# Patient Record
Sex: FEMALE | Race: WHITE | NOT HISPANIC OR LATINO | Employment: PART TIME | RURAL
[De-identification: names, ages, dates, MRNs, and addresses within clinical notes are randomized per-mention and may not be internally consistent; named-entity substitution may affect disease eponyms.]

---

## 2017-03-15 ENCOUNTER — OFFICE VISIT (OUTPATIENT)
Dept: CARDIOLOGY | Facility: CLINIC | Age: 62
End: 2017-03-15

## 2017-03-15 VITALS
DIASTOLIC BLOOD PRESSURE: 94 MMHG | BODY MASS INDEX: 38.76 KG/M2 | WEIGHT: 227 LBS | SYSTOLIC BLOOD PRESSURE: 162 MMHG | HEART RATE: 64 BPM | HEIGHT: 64 IN

## 2017-03-15 DIAGNOSIS — E78.00 HYPERCHOLESTEREMIA: ICD-10-CM

## 2017-03-15 DIAGNOSIS — E03.9 ACQUIRED HYPOTHYROIDISM: ICD-10-CM

## 2017-03-15 DIAGNOSIS — E88.81 METABOLIC SYNDROME: ICD-10-CM

## 2017-03-15 DIAGNOSIS — I10 ESSENTIAL HYPERTENSION: ICD-10-CM

## 2017-03-15 DIAGNOSIS — E83.52 HYPERCALCEMIA: ICD-10-CM

## 2017-03-15 DIAGNOSIS — I48.0 PAF (PAROXYSMAL ATRIAL FIBRILLATION) (HCC): Primary | ICD-10-CM

## 2017-03-15 DIAGNOSIS — E83.42 HYPOMAGNESEMIA: ICD-10-CM

## 2017-03-15 PROCEDURE — 93000 ELECTROCARDIOGRAM COMPLETE: CPT | Performed by: INTERNAL MEDICINE

## 2017-03-15 PROCEDURE — 99213 OFFICE O/P EST LOW 20 MIN: CPT | Performed by: INTERNAL MEDICINE

## 2017-03-15 RX ORDER — GABAPENTIN 100 MG/1
100 CAPSULE ORAL 3 TIMES DAILY
COMMUNITY
End: 2018-07-24 | Stop reason: ALTCHOICE

## 2017-03-15 RX ORDER — LISINOPRIL 5 MG/1
5 TABLET ORAL DAILY
COMMUNITY
End: 2018-07-24 | Stop reason: SDUPTHER

## 2017-03-15 NOTE — PROGRESS NOTES
Chief Complaint   Patient presents with   • Follow-up     shortness of breath no worse usual. Denies palpitations. Doesn't need refills at this time. Labs per endocrinologist in August. Was started on gabapentin for pinched nerve and wanted your opinion before she started taking.    • Chest Pain     Has had some heaviness in her chest for the past few days.        CARDIAC COMPLAINTS  chest pressure/discomfort, dyspnea and back pain        Subjective   Dunia Eduardo is a 61 y.o. female came in today for her follow up visit.  She has history of PAF who has converted and has done very well with medication.  She came today stating that she has been having trouble with her back and apparenlty she was carrying her dog and injured her back.  She was started on Neurontin and she wants to know if she can take it without any cardiac side effects.  She also has been having some chest tightness recently.  She claims she had a stress test in the last couple of years.              Cardiac History  Past Surgical History   Procedure Laterality Date   • Other surgical history  12/2011     cerebral angiogram, [facility] [place]   • Echo - converted  08/16/2012     EF 65%. RVSP-41mmHg. R/O PFO   • Echo - converted  09/24/2012     small PFO   • Cardiovascular stress test  09/24/2012     5 Min, 85% THR. BP-178/90. Pt had CP. Negative    • Other surgical history  2012     Cardionet-PAF   • Other surgical history  11/02/2012     small PFO   • Echo - converted  01/16/2015     EF 65%   • Cardioversion  01/23/2015       Current Outpatient Prescriptions   Medication Sig Dispense Refill   • apixaban (ELIQUIS) 5 MG tablet tablet Take 5 mg by mouth 2 (two) times a day.     • diltiazem CD (CARDIZEM CD) 120 MG 24 hr capsule TAKE ONE CAPSULE BY MOUTH DAILY ONCE A DAY ORALLY 90 DAYS 90 capsule 1   • flecainide (TAMBOCOR) 100 MG tablet TAKE 1 TABLET BY MOUTH EVERY 12 HOURS 180 tablet 2   • gabapentin (NEURONTIN) 100 MG capsule Take 100 mg by mouth 3  (Three) Times a Day.     • levothyroxine (SYNTHROID, LEVOTHROID) 50 MCG tablet Take 50 mcg by mouth daily.     • lisinopril (PRINIVIL,ZESTRIL) 5 MG tablet Take 5 mg by mouth 2 (Two) Times a Day.     • sertraline (ZOLOFT) 25 MG tablet Take 25 mg by mouth daily.       No current facility-administered medications for this visit.        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     Social History Main Topics   • Smoking status: Former Smoker     Quit date: 9/14/2008   • Smokeless tobacco: Never Used   • Alcohol use No   • Drug use: No   • Sexual activity: Not on file     Other Topics Concern   • Not on file     Social History Narrative       Family History   Problem Relation Age of Onset   • Heart failure Mother    • Heart failure Father    • Hypertension Brother        Review of Systems   Constitutional: Positive for fatigue. Negative for activity change.   HENT: Negative for congestion.    Eyes: Negative for discharge.   Respiratory: Positive for chest tightness. Negative for shortness of breath.    Cardiovascular: Positive for chest pain.   Gastrointestinal: Negative for abdominal distention.   Endocrine: Negative for cold intolerance.   Genitourinary: Negative for difficulty urinating.   Musculoskeletal: Positive for arthralgias and back pain.   Skin: Negative for color change.   Allergic/Immunologic: Negative  "for environmental allergies.   Neurological: Negative for dizziness and speech difficulty.   Hematological: Negative for adenopathy.       Diabetes- No  Thyroid- abnormal    Objective     Visit Vitals   • /94   • Pulse 64   • Ht 64\" (162.6 cm)   • Wt 227 lb (103 kg)   • BMI 38.96 kg/m2       Physical Exam   Constitutional: She appears well-developed.   HENT:   Head: Normocephalic.   Eyes: Pupils are equal, round, and reactive to light.   Neck: Normal range of motion.   Cardiovascular: Normal rate.    Murmur heard.  Pulmonary/Chest: Effort normal.   Musculoskeletal: Normal range of motion.   Neurological: She is alert.   Skin: Skin is warm.         ECG 12 Lead  Date/Time: 3/15/2017 11:33 AM  Performed by: EMA SOTO  Authorized by: EMA SOTO   Comparison: compared with previous ECG from 9/14/2016  Similar to previous ECG  Rhythm: sinus rhythm  Rate: normal  QRS axis: left  Other findings: LVH  Clinical impression: abnormal ECG                  Assessment/Plan   Her HR is stable.  BP is slightly elevated.  EKG shows sinus rhythm with normal QTc.  I had a long talk with her about the BP.  If it continues to go up, she may need to increase the dose of Lisinopril.  She also has history of hypercalcinemia and was advised to undergo parathyroidectomy which she has refused.  Her labs should be rechecked also.  Her cardiac status appears stable.  If the chest tightness persist, she may need to undergo repeat stress test.  Dunia was seen today for follow-up and chest pain.    Diagnoses and all orders for this visit:    PAF (paroxysmal atrial fibrillation)  -     TSH; Future    Acquired hypothyroidism  -     TSH; Future    Metabolic syndrome  -     CBC & Differential; Future    Hypercalcemia  -     PTH, Intact; Future    Hypomagnesemia  -     Magnesium; Future    Hypercholesteremia  -     Lipid Panel; Future    Essential hypertension  -     Comprehensive Metabolic Panel; Future                 "         Electronically signed by Karely Avina MD March 15, 2017 11:30 AM

## 2017-03-27 RX ORDER — APIXABAN 5 MG/1
TABLET, FILM COATED ORAL
Qty: 180 TABLET | Refills: 2 | Status: SHIPPED | OUTPATIENT
Start: 2017-03-27 | End: 2018-01-06 | Stop reason: SDUPTHER

## 2017-06-06 RX ORDER — DILTIAZEM HYDROCHLORIDE 120 MG/1
CAPSULE, COATED, EXTENDED RELEASE ORAL
Qty: 90 CAPSULE | Refills: 1 | Status: SHIPPED | OUTPATIENT
Start: 2017-06-06 | End: 2017-11-20 | Stop reason: SDUPTHER

## 2017-06-21 RX ORDER — FLECAINIDE ACETATE 100 MG/1
TABLET ORAL
Qty: 180 TABLET | Refills: 2 | Status: SHIPPED | OUTPATIENT
Start: 2017-06-21 | End: 2018-04-04 | Stop reason: SDUPTHER

## 2017-11-20 RX ORDER — DILTIAZEM HYDROCHLORIDE 120 MG/1
CAPSULE, COATED, EXTENDED RELEASE ORAL
Qty: 90 CAPSULE | Refills: 1 | Status: SHIPPED | OUTPATIENT
Start: 2017-11-20 | End: 2018-04-16 | Stop reason: SDUPTHER

## 2017-11-21 ENCOUNTER — OFFICE VISIT (OUTPATIENT)
Dept: CARDIOLOGY | Facility: CLINIC | Age: 62
End: 2017-11-21

## 2017-11-21 VITALS
HEIGHT: 64 IN | WEIGHT: 238 LBS | SYSTOLIC BLOOD PRESSURE: 140 MMHG | DIASTOLIC BLOOD PRESSURE: 90 MMHG | HEART RATE: 64 BPM | BODY MASS INDEX: 40.63 KG/M2

## 2017-11-21 DIAGNOSIS — I10 ESSENTIAL HYPERTENSION: ICD-10-CM

## 2017-11-21 DIAGNOSIS — E03.9 ACQUIRED HYPOTHYROIDISM: ICD-10-CM

## 2017-11-21 DIAGNOSIS — I48.0 PAF (PAROXYSMAL ATRIAL FIBRILLATION) (HCC): Primary | ICD-10-CM

## 2017-11-21 DIAGNOSIS — E83.52 HYPERCALCEMIA: ICD-10-CM

## 2017-11-21 DIAGNOSIS — E78.00 HYPERCHOLESTEREMIA: ICD-10-CM

## 2017-11-21 DIAGNOSIS — E34.9 INCREASED PTH LEVEL: ICD-10-CM

## 2017-11-21 PROBLEM — R79.89 INCREASED PTH LEVEL: Status: ACTIVE | Noted: 2017-11-21

## 2017-11-21 PROCEDURE — 93000 ELECTROCARDIOGRAM COMPLETE: CPT | Performed by: INTERNAL MEDICINE

## 2017-11-21 PROCEDURE — 99213 OFFICE O/P EST LOW 20 MIN: CPT | Performed by: INTERNAL MEDICINE

## 2017-11-21 NOTE — PROGRESS NOTES
Chief Complaint   Patient presents with   • Follow-up     For cardiac management. Reports having some shortness of breath on exertion. Reports last lab work was done a couplt of months ago per PCP, not in chart, will have them send us a copy.    • Med Refill     Does not need refills at this time.        CARDIAC COMPLAINTS  dyspnea        Subjective   Dunia Eduardo is a 61 y.o. female came in today for her follow-up visit.  She has history of PAF for which she has undergone cardioversion in 2015.  She came today with symptoms of shortness of breath almost same as before.  In March of this year her lab work showed evidence of hypercalcemia and elevated PTH.  She is supposed to see a surgeon to undergo parathyroidectomy.  Apparently she decided not to undergo any of the procedure.  She has some lab work few months ago but I don't have the report.              Cardiac History  Past Surgical History:   Procedure Laterality Date   • CARDIOVASCULAR STRESS TEST  09/24/2012    5 Min, 85% THR. BP-178/90. Pt had CP. Negative    • CARDIOVERSION  01/23/2015    Converted    • ECHO - CONVERTED  08/16/2012    EF 65%. RVSP-41mmHg. R/O PFO   • ECHO - CONVERTED  09/24/2012    small PFO   • ECHO - CONVERTED  01/16/2015    EF 65%   • OTHER SURGICAL HISTORY  12/2011    cerebral angiogram, [facility]   • OTHER SURGICAL HISTORY  2012    Cardionet-PAF   • OTHER SURGICAL HISTORY  11/02/2012    small PFO       Current Outpatient Prescriptions   Medication Sig Dispense Refill   • diltiaZEM CD (CARDIZEM CD) 120 MG 24 hr capsule TAKE ONE CAPSULE BY MOUTH ONCE DAILY 90 capsule 1   • ELIQUIS 5 MG tablet tablet TAKE 1 TABLET TWICE A  tablet 2   • flecainide (TAMBOCOR) 100 MG tablet TAKE 1 TABLET BY MOUTH EVERY 12 HOURS 180 tablet 2   • gabapentin (NEURONTIN) 100 MG capsule Take 100 mg by mouth 3 (Three) Times a Day.     • levothyroxine (SYNTHROID, LEVOTHROID) 50 MCG tablet Take 50 mcg by mouth daily.     • lisinopril  (PRINIVIL,ZESTRIL) 5 MG tablet Take 5 mg by mouth 2 (Two) Times a Day.     • sertraline (ZOLOFT) 25 MG tablet Take 25 mg by mouth daily.       No current facility-administered medications for this visit.        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     Social History Main Topics   • Smoking status: Former Smoker     Quit date: 9/14/2008   • Smokeless tobacco: Never Used   • Alcohol use No   • Drug use: No   • Sexual activity: Not on file     Other Topics Concern   • Not on file     Social History Narrative       Family History   Problem Relation Age of Onset   • Heart failure Mother    • Heart failure Father    • Hypertension Brother        Review of Systems   Constitution: Positive for malaise/fatigue. Negative for decreased appetite.   HENT: Negative for congestion and sore throat.    Eyes: Negative for blurred vision.   Cardiovascular: Positive for dyspnea on exertion. Negative for chest pain.   Respiratory: Positive for shortness of breath. Negative for snoring.    Endocrine: Negative for cold intolerance and heat intolerance.   Hematologic/Lymphatic: Negative for adenopathy. Does not bruise/bleed easily.   Skin: Negative for itching, nail changes and skin cancer.   Musculoskeletal: Positive for arthritis. Negative for myalgias.   Gastrointestinal: Negative for abdominal pain, dysphagia and heartburn.  "  Genitourinary: Negative for bladder incontinence and frequency.   Neurological: Negative for dizziness, light-headedness, seizures and vertigo.   Psychiatric/Behavioral: Negative for altered mental status.   Allergic/Immunologic: Negative for environmental allergies and hives.       Diabetes- No  Thyroid- abnormal    Objective     /90 (BP Location: Right arm)  Pulse 64  Ht 64\" (162.6 cm)  Wt 238 lb (108 kg)  BMI 40.85 kg/m2    Physical Exam   Constitutional: She is oriented to person, place, and time. She appears well-developed and well-nourished.   HENT:   Head: Normocephalic.   Eyes: Pupils are equal, round, and reactive to light.   Neck: Normal range of motion.   Cardiovascular: Normal rate, regular rhythm, S1 normal and S2 normal.    Murmur heard.  Pulmonary/Chest: Effort normal and breath sounds normal.   Abdominal: Soft. Bowel sounds are normal.   Musculoskeletal: Normal range of motion. She exhibits no edema.   Neurological: She is alert and oriented to person, place, and time.   Skin: Skin is warm.   Psychiatric: She has a normal mood and affect.       ECG 12 Lead  Date/Time: 11/21/2017 5:02 PM  Performed by: EMA SOTO  Authorized by: EMA SOTO   Comparison: compared with previous ECG from 3/20/2017  Similar to previous ECG  Rhythm: sinus rhythm  Rate: normal  QRS axis: normal  Q waves: V1 and V2  Clinical impression: abnormal ECG                    Assessment/Plan     Dunia was seen today for follow-up and med refill.    Diagnoses and all orders for this visit:    PAF (paroxysmal atrial fibrillation)    Acquired hypothyroidism    Hypercalcemia    Increased PTH level    Essential hypertension    Hypercholesteremia       Her heart rate and blood pressure appears to be stable.  EKG showed sinus rhythm with normal QTC.  Old Q waves in V1 and V2.  Her clinical examination is unremarkable.  I had a long talk with her about the hypercalcemia and the PTH level.  I explained to her " the increased risk of having hypercalcemia.  She is advised to talk to you regarding that.  Her cardiac status appears stable.  Clearance will be given if needed for the surgery.                  Electronically signed by Karely Avina MD November 21, 2017 4:59 PM

## 2018-01-08 RX ORDER — APIXABAN 5 MG/1
TABLET, FILM COATED ORAL
Qty: 180 TABLET | Refills: 3 | Status: SHIPPED | OUTPATIENT
Start: 2018-01-08 | End: 2018-07-24 | Stop reason: SDUPTHER

## 2018-01-29 ENCOUNTER — TELEPHONE (OUTPATIENT)
Dept: CARDIOLOGY | Facility: CLINIC | Age: 63
End: 2018-01-29

## 2018-01-29 NOTE — TELEPHONE ENCOUNTER
PCP's office called wanting pt to be seen, CP, dizziness.  Do you want follow up scheduled or testing?  Last stress was in 2012, last echo was 2015.

## 2018-04-09 RX ORDER — FLECAINIDE ACETATE 100 MG/1
TABLET ORAL
Qty: 180 TABLET | Refills: 2 | Status: SHIPPED | OUTPATIENT
Start: 2018-04-09 | End: 2018-07-24 | Stop reason: SDUPTHER

## 2018-04-16 ENCOUNTER — TELEPHONE (OUTPATIENT)
Dept: CARDIOLOGY | Facility: CLINIC | Age: 63
End: 2018-04-16

## 2018-04-16 RX ORDER — DILTIAZEM HYDROCHLORIDE 120 MG/1
120 CAPSULE, COATED, EXTENDED RELEASE ORAL DAILY
Qty: 90 CAPSULE | Refills: 1 | Status: SHIPPED | OUTPATIENT
Start: 2018-04-16 | End: 2018-07-24 | Stop reason: SDUPTHER

## 2018-04-16 NOTE — TELEPHONE ENCOUNTER
Patient called requesting refill on Cardizem CD 120mg once daily. Refill on cardizem CD 120mg daily sent to [id number] with 1 refill.

## 2018-05-21 ENCOUNTER — TELEPHONE (OUTPATIENT)
Dept: CARDIOLOGY | Facility: CLINIC | Age: 63
End: 2018-05-21

## 2018-05-21 NOTE — TELEPHONE ENCOUNTER
Received fax from Dr. Eze Rizzo for cardiac clearance for patient to have a parathyroidectomy on 05/29/18. Patient is on Eliquis. According to our records, I do not see where patient has had any stenting.       Fax [phone]

## 2018-07-24 ENCOUNTER — OFFICE VISIT (OUTPATIENT)
Dept: CARDIOLOGY | Facility: CLINIC | Age: 63
End: 2018-07-24

## 2018-07-24 VITALS
HEIGHT: 64 IN | HEART RATE: 61 BPM | SYSTOLIC BLOOD PRESSURE: 128 MMHG | WEIGHT: 237 LBS | BODY MASS INDEX: 40.46 KG/M2 | DIASTOLIC BLOOD PRESSURE: 88 MMHG

## 2018-07-24 DIAGNOSIS — R12 HEART BURN: ICD-10-CM

## 2018-07-24 DIAGNOSIS — R55 NEAR SYNCOPE: ICD-10-CM

## 2018-07-24 DIAGNOSIS — I10 ESSENTIAL HYPERTENSION: ICD-10-CM

## 2018-07-24 DIAGNOSIS — E78.00 HYPERCHOLESTEREMIA: ICD-10-CM

## 2018-07-24 DIAGNOSIS — E88.81 METABOLIC SYNDROME: ICD-10-CM

## 2018-07-24 DIAGNOSIS — I48.0 PAF (PAROXYSMAL ATRIAL FIBRILLATION) (HCC): Primary | ICD-10-CM

## 2018-07-24 DIAGNOSIS — E34.9 INCREASED PTH LEVEL: ICD-10-CM

## 2018-07-24 PROBLEM — E88.810 METABOLIC SYNDROME: Status: ACTIVE | Noted: 2018-07-24

## 2018-07-24 PROCEDURE — 99214 OFFICE O/P EST MOD 30 MIN: CPT | Performed by: INTERNAL MEDICINE

## 2018-07-24 RX ORDER — ERGOCALCIFEROL 1.25 MG/1
50000 CAPSULE ORAL WEEKLY
COMMUNITY
End: 2018-10-30

## 2018-07-24 RX ORDER — FLECAINIDE ACETATE 100 MG/1
100 TABLET ORAL EVERY 12 HOURS
Qty: 180 TABLET | Refills: 3 | Status: SHIPPED | OUTPATIENT
Start: 2018-07-24 | End: 2018-10-30

## 2018-07-24 RX ORDER — RANITIDINE 300 MG/1
300 TABLET ORAL NIGHTLY
Qty: 90 TABLET | Refills: 4 | Status: SHIPPED | OUTPATIENT
Start: 2018-07-24 | End: 2020-08-18

## 2018-07-24 RX ORDER — LISINOPRIL 5 MG/1
5 TABLET ORAL DAILY
Qty: 90 TABLET | Refills: 3 | Status: SHIPPED | OUTPATIENT
Start: 2018-07-24 | End: 2019-03-26

## 2018-07-24 RX ORDER — DILTIAZEM HYDROCHLORIDE 120 MG/1
120 CAPSULE, COATED, EXTENDED RELEASE ORAL DAILY
Qty: 90 CAPSULE | Refills: 4 | Status: SHIPPED | OUTPATIENT
Start: 2018-07-24 | End: 2019-09-29 | Stop reason: SDUPTHER

## 2018-07-25 PROCEDURE — 93000 ELECTROCARDIOGRAM COMPLETE: CPT | Performed by: INTERNAL MEDICINE

## 2018-07-25 NOTE — PROGRESS NOTES
Chief Complaint   Patient presents with   • Follow-up     for cardiac management   • Near syncope     multiple epidoses, always occurs while standing, feeling comes over her, goes away after sitting down. Last episode was this morning. Chest heaviness noted with the episodes.    • Palpitations     much better, occuring maybe twice a month   • Procedure     partial parathyroidectomy, May 29th, Dr iRzzo in [place]   • Med Refill     refills needed on cardiac meds. 90 days to [facility], asking for a copay card for the Eliquis.    • Labs     checked since surgery, vitamin D added       CARDIAC COMPLAINTS  Dizziness, palpitations and syncope        Subjective   Dunia Eduardo is a 62 y.o. female came in today for her follow-up visit.  She has history of atrial fibrillation for which she underwent cardioversion in January 2015.  She also has a normal LV function and mild pulmonary hypertension.  Since I have seen her she did undergo partial parathyroidectomy and now is taking vitamin D.  She is not having that many palpitation as before.  Her major problem is dizziness and lightheadedness and near-syncopal episode.  It occurs suddenly and she has to sit down and within a few minutes the episode gets better.  She denies having any chest pain.  Her lab work is essentially followed at your office.              Cardiac History  Past Surgical History:   Procedure Laterality Date   • CARDIOVASCULAR STRESS TEST  09/24/2012    5 Min, 85% THR. BP-178/90. Pt had CP. Negative    • CARDIOVERSION  01/23/2015    Converted    • ECHO - CONVERTED  08/16/2012    EF 65%. RVSP-41mmHg. R/O PFO   • ECHO - CONVERTED  09/24/2012    small PFO   • ECHO - CONVERTED  01/16/2015    EF 65%   • OTHER SURGICAL HISTORY  12/2011    cerebral angiogram, [facility]   • OTHER SURGICAL HISTORY  2012    Cardionet-PAF   • OTHER SURGICAL HISTORY  11/02/2012    small PFO       Current Outpatient Prescriptions   Medication Sig Dispense Refill   • apixaban  (ELIQUIS) 5 MG tablet tablet Take 1 tablet by mouth 2 (Two) Times a Day. 180 tablet 4   • diltiaZEM CD (CARDIZEM CD) 120 MG 24 hr capsule Take 1 capsule by mouth Daily. 90 capsule 4   • flecainide (TAMBOCOR) 100 MG tablet Take 1 tablet by mouth Every 12 (Twelve) Hours. 180 tablet 3   • levothyroxine (SYNTHROID, LEVOTHROID) 50 MCG tablet Take 50 mcg by mouth daily.     • lisinopril (PRINIVIL,ZESTRIL) 5 MG tablet Take 1 tablet by mouth Daily. 90 tablet 3   • sertraline (ZOLOFT) 25 MG tablet Take 25 mg by mouth daily.     • vitamin D (ERGOCALCIFEROL) 64222 units capsule capsule Take 50,000 Units by mouth 1 (One) Time Per Week.     • raNITIdine (ZANTAC) 300 MG tablet Take 1 tablet by mouth Every Night. 90 tablet 4     No current facility-administered medications for this visit.        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   • Gallbladder removed    • Heart murmur    • Hx of cholecystectomy    • Hx of hysterectomy    • Hypercalcemia    • Hyperlipidemia    • Hypertension    • Hypothyroidism    • PAF (paroxysmal atrial fibrillation) (CMS/HCC)        Social History     Social History   • Marital status:      Spouse name: N/A   • Number of children: N/A   • Years of education: N/A     Occupational History   • Not on file.     Social History Main Topics   • Smoking status: Former Smoker     Quit date: 9/14/2008   • Smokeless tobacco: Never Used   • Alcohol use No   • Drug use: No   • Sexual activity: Not on file     Other Topics Concern   • Not on file     Social History Narrative   • No narrative on file       Family History   Problem Relation Age of Onset   • Heart failure Mother    • Heart failure Father    • Hypertension Brother        Review of Systems   Constitution:  "Positive for malaise/fatigue. Negative for decreased appetite.   HENT: Negative for congestion and sore throat.    Eyes: Negative for blurred vision.   Cardiovascular: Positive for near-syncope and palpitations. Negative for chest pain.   Respiratory: Negative for shortness of breath and snoring.    Endocrine: Negative for cold intolerance and heat intolerance.   Hematologic/Lymphatic: Negative for adenopathy. Does not bruise/bleed easily.   Skin: Negative for itching, nail changes and skin cancer.   Musculoskeletal: Negative for arthritis and myalgias.   Gastrointestinal: Negative for abdominal pain, dysphagia and heartburn.   Genitourinary: Negative for bladder incontinence and frequency.   Neurological: Positive for dizziness. Negative for light-headedness, seizures and vertigo.   Psychiatric/Behavioral: Negative for altered mental status.   Allergic/Immunologic: Negative for environmental allergies and hives.       Diabetes- No  Thyroid- normal    Objective     /88   Pulse 61   Ht 162.6 cm (64\")   Wt 108 kg (237 lb)   BMI 40.68 kg/m²     Physical Exam   Constitutional: She is oriented to person, place, and time. She appears well-developed and well-nourished.   HENT:   Head: Normocephalic.   Nose: Nose normal.   Eyes: Pupils are equal, round, and reactive to light. EOM are normal.   Neck: Normal range of motion. Neck supple.   Cardiovascular: Normal rate, regular rhythm, S1 normal and S2 normal.    Murmur heard.  Pulmonary/Chest: Effort normal and breath sounds normal.   Abdominal: Soft. Bowel sounds are normal.   Musculoskeletal: Normal range of motion. She exhibits no edema.   Neurological: She is alert and oriented to person, place, and time.   Skin: Skin is warm and dry.   Psychiatric: She has a normal mood and affect.       ECG 12 Lead  Date/Time: 7/25/2018 5:41 AM  Performed by: EMA SOTO  Authorized by: EMA SOTO   Comparison: compared with previous ECG from " 11/21/2017  Comparison to previous ECG: Axis has changed  Rhythm: sinus rhythm  Rate: normal  Conduction: non-specific intraventricular conduction delay  QRS axis: left  Q waves: V3 and V4  Clinical impression: abnormal ECG                    Assessment/Plan   Patient's Body mass index is 40.68 kg/m². BMI is above normal parameters. Recommendations include: educational material, exercise counseling and nutrition counseling.     Dunia was seen today for follow-up, near syncope, palpitations, procedure, med refill and labs.    Diagnoses and all orders for this visit:    PAF (paroxysmal atrial fibrillation) (CMS/HCC)  -     flecainide (TAMBOCOR) 100 MG tablet; Take 1 tablet by mouth Every 12 (Twelve) Hours.  -     apixaban (ELIQUIS) 5 MG tablet tablet; Take 1 tablet by mouth 2 (Two) Times a Day.  -     diltiaZEM CD (CARDIZEM CD) 120 MG 24 hr capsule; Take 1 capsule by mouth Daily.  -     Stress Test With Myocardial Perfusion One Day; Future    Essential hypertension  -     lisinopril (PRINIVIL,ZESTRIL) 5 MG tablet; Take 1 tablet by mouth Daily.  -     diltiaZEM CD (CARDIZEM CD) 120 MG 24 hr capsule; Take 1 capsule by mouth Daily.  -     Adult Transthoracic Echo Complete W/ Cont if Necessary Per Protocol; Future    Hypercholesteremia    Increased PTH level    Near syncope  -     Stress Test With Myocardial Perfusion One Day; Future    Metabolic syndrome  -     Adult Transthoracic Echo Complete W/ Cont if Necessary Per Protocol; Future    Heart burn  -     raNITIdine (ZANTAC) 300 MG tablet; Take 1 tablet by mouth Every Night.  -     Stress Test With Myocardial Perfusion One Day; Future       Her heart rate and blood pressure appears stable.  Her EKG showed sinus rhythm her axis was changed to left, IVCD and Q waves in V3 and V4.  Her BMI is still around 41.  I talked to her about diet, exercise and weight reduction.  At this time continue the same medications.  She has been having some heartburn so I added Zantac 300 mg  once a day .Her last cardiac workup was few years ago.  I scheduled her to repeat the stress test to rule out any ischemia causing the heartburn and also rule out any stress-induced arrhythmia.  Also scheduled her to undergo the echocardiogram to reevaluate the LV function and also to evaluate the PA pressure.  Refill for other cardiac medications were given.  Copayment card for Eliquis was given.                  Electronically signed by Karely Avina MD July 25, 2018 5:35 AM

## 2018-08-01 ENCOUNTER — HOSPITAL ENCOUNTER (OUTPATIENT)
Dept: CARDIOLOGY | Facility: HOSPITAL | Age: 63
Discharge: HOME | End: 2018-08-01
Attending: INTERNAL MEDICINE

## 2018-08-01 DIAGNOSIS — R12 HEART BURN: ICD-10-CM

## 2018-08-01 DIAGNOSIS — I10 ESSENTIAL HYPERTENSION: ICD-10-CM

## 2018-08-01 DIAGNOSIS — R55 NEAR SYNCOPE: ICD-10-CM

## 2018-08-01 DIAGNOSIS — E88.81 METABOLIC SYNDROME: ICD-10-CM

## 2018-08-01 DIAGNOSIS — I48.0 PAF (PAROXYSMAL ATRIAL FIBRILLATION) (HCC): ICD-10-CM

## 2018-08-01 LAB
MAXIMAL PREDICTED HEART RATE: 156 BPM
MAXIMAL PREDICTED HEART RATE: 156 BPM
STRESS TARGET HR: 133 BPM
STRESS TARGET HR: 133 BPM

## 2018-08-01 PROCEDURE — A9500 TC99M SESTAMIBI: HCPCS | Performed by: INTERNAL MEDICINE

## 2018-08-01 PROCEDURE — 93018 CV STRESS TEST I&R ONLY: CPT | Performed by: INTERNAL MEDICINE

## 2018-08-01 PROCEDURE — 93017 CV STRESS TEST TRACING ONLY: CPT

## 2018-08-01 PROCEDURE — 93306 TTE W/DOPPLER COMPLETE: CPT | Performed by: INTERNAL MEDICINE

## 2018-08-01 PROCEDURE — 93306 TTE W/DOPPLER COMPLETE: CPT

## 2018-08-01 PROCEDURE — 0 TECHNETIUM SESTAMIBI: Performed by: INTERNAL MEDICINE

## 2018-08-01 PROCEDURE — 78452 HT MUSCLE IMAGE SPECT MULT: CPT | Performed by: INTERNAL MEDICINE

## 2018-08-01 PROCEDURE — 78452 HT MUSCLE IMAGE SPECT MULT: CPT

## 2018-08-01 RX ADMIN — TECHNETIUM TC 99M SESTAMIBI 1 DOSE: 1 INJECTION INTRAVENOUS at 09:51

## 2018-08-03 ENCOUNTER — TELEPHONE (OUTPATIENT)
Dept: CARDIOLOGY | Facility: CLINIC | Age: 63
End: 2018-08-03

## 2018-08-03 NOTE — TELEPHONE ENCOUNTER
Patient aware of stress test and echo results and recommendations.  Negative for ischemia, normal LV function.  Continue home medications.

## 2018-10-09 ENCOUNTER — TELEPHONE (OUTPATIENT)
Dept: CARDIOLOGY | Facility: CLINIC | Age: 63
End: 2018-10-09

## 2018-10-09 NOTE — TELEPHONE ENCOUNTER
Pt called insisting on being seen by you today. Heart rate 118 at rest, states much higher with exertion and complaining of being extremely SOB. Advised that your schedule was full, offered NP. Decided she would go to ER for evaluation due to being so symptomatic.

## 2018-10-10 ENCOUNTER — OUTSIDE FACILITY SERVICE (OUTPATIENT)
Dept: CARDIOLOGY | Facility: CLINIC | Age: 63
End: 2018-10-10

## 2018-10-10 DIAGNOSIS — I48.92 ATRIAL FLUTTER, UNSPECIFIED TYPE (HCC): Primary | ICD-10-CM

## 2018-10-10 PROCEDURE — 92960 CARDIOVERSION ELECTRIC EXT: CPT | Performed by: INTERNAL MEDICINE

## 2018-10-10 PROCEDURE — 99223 1ST HOSP IP/OBS HIGH 75: CPT | Performed by: INTERNAL MEDICINE

## 2018-10-11 ENCOUNTER — OUTSIDE FACILITY SERVICE (OUTPATIENT)
Dept: CARDIOLOGY | Facility: CLINIC | Age: 63
End: 2018-10-11

## 2018-10-11 PROCEDURE — 99232 SBSQ HOSP IP/OBS MODERATE 35: CPT | Performed by: INTERNAL MEDICINE

## 2018-10-30 ENCOUNTER — PREP FOR SURGERY (OUTPATIENT)
Dept: OTHER | Facility: HOSPITAL | Age: 63
End: 2018-10-30

## 2018-10-30 ENCOUNTER — CONSULT (OUTPATIENT)
Dept: CARDIOLOGY | Facility: CLINIC | Age: 63
End: 2018-10-30

## 2018-10-30 VITALS
SYSTOLIC BLOOD PRESSURE: 128 MMHG | HEART RATE: 59 BPM | DIASTOLIC BLOOD PRESSURE: 88 MMHG | HEIGHT: 63 IN | WEIGHT: 235 LBS | BODY MASS INDEX: 41.64 KG/M2

## 2018-10-30 DIAGNOSIS — I48.0 PAF (PAROXYSMAL ATRIAL FIBRILLATION) (HCC): ICD-10-CM

## 2018-10-30 DIAGNOSIS — I48.3 TYPICAL ATRIAL FLUTTER (HCC): Primary | ICD-10-CM

## 2018-10-30 PROCEDURE — 99244 OFF/OP CNSLTJ NEW/EST MOD 40: CPT | Performed by: INTERNAL MEDICINE

## 2018-10-30 PROCEDURE — 93000 ELECTROCARDIOGRAM COMPLETE: CPT | Performed by: INTERNAL MEDICINE

## 2018-10-30 NOTE — PROGRESS NOTES
Dunia Eduardo  1955  PCP: Christopher Villalobos MD    SUBJECTIVE:   Dunia Eduardo is a 62 y.o. female seen for a consultation visit regarding the following:     Chief Complaint:   Chief Complaint   Patient presents with   • Atrial Fibrillation          Consultation is requested by Karely Avina MD for evaluation of Atrial Fibrillation        History:  This is a 62 y.o. female followed by Dr. Avina.  She has history of atrial fibrillation for which she underwent cardioversion in January 2015.  She also has a normal LV function and mild pulmonary hypertension. She saw Dr. Avina in Oct 2018 and found to have A. Flutter. She underwent a CV. In the A. Flutter she was dizziness, lightheadedness and near-syncopal episode. She had been on Flecainide in the past but now is on Multaq. Developed GI upset on Pradaxa. Had difficulty controling her HR in the ED with meds, therefore, required Cardioversion.       Cardiac PMH: (Old records have been reviewed and summarized below)  1. Atrial Fibrillation - Treated with Flecainide since 2015. Changed to Multaq in Oct 2018  2. Atrial Flutter  3. Hypothyroid    Past Medical History, Past Surgical History, Family history, Social History, and Medications were all reviewed with the patient today and updated as necessary.       Current Outpatient Prescriptions:   •  apixaban (ELIQUIS) 5 MG tablet tablet, Take 1 tablet by mouth 2 (Two) Times a Day., Disp: 180 tablet, Rfl: 4  •  diltiaZEM CD (CARDIZEM CD) 120 MG 24 hr capsule, Take 1 capsule by mouth Daily., Disp: 90 capsule, Rfl: 4  •  dronedarone (MULTAQ) 400 MG tablet, Take 400 mg by mouth 2 (Two) Times a Day With Meals., Disp: , Rfl:   •  levothyroxine (SYNTHROID, LEVOTHROID) 50 MCG tablet, Take 50 mcg by mouth daily., Disp: , Rfl:   •  lisinopril (PRINIVIL,ZESTRIL) 5 MG tablet, Take 1 tablet by mouth Daily., Disp: 90 tablet, Rfl: 3  •  raNITIdine (ZANTAC) 300 MG tablet, Take 1 tablet by mouth Every Night., Disp: 90 tablet,  Rfl: 4  •  sertraline (ZOLOFT) 25 MG tablet, Take 25 mg by mouth daily., Disp: , Rfl:     Allergies   Allergen Reactions   • Codeine    • Morphine And Related    • Pradaxa [Dabigatran Etexilate Mesylate]          Past Medical History:   Diagnosis Date   • Aneurysm (CMS/HCC)     brain, ruptured with clipping, 8years ago   • Aneurysm (CMS/HCC)     2 small cerebral, 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 BP-178/90. Pt had CP. Negative    • CARDIOVASCULAR STRESS TEST  08/01/2018    (Mod) 9 Min. 4.6 METS. 76% THR. BP- 197/97. Negative.   • CARDIOVERSION  01/23/2015    Converted    • CARDIOVERSION  10/10/2018    Converted to Sinus   • ECHO - CONVERTED  08/16/2012    EF 65%. RVSP-41mmHg. R/O PFO   • ECHO - CONVERTED  09/24/2012    small PFO   • ECHO - CONVERTED  01/16/2015    EF 65%   • ECHO - CONVERTED  08/01/2018    EF 65%. RVSP- 40 mmHg.   • OTHER SURGICAL HISTORY  12/2011    cerebral angiogram, [facility] [place]   • OTHER SURGICAL HISTORY  2012    Cardionet-PAF   • OTHER SURGICAL HISTORY  11/02/2012    small PFO     Family History   Problem Relation Age of Onset   • Heart failure Mother    • Heart failure Father    • Hypertension Brother    • Heart attack Other    • Heart disease Other    • Heart failure Other    • Diabetes Other    • Hypertension Other    • Clotting disorder Other      Social History   Substance Use Topics   • Smoking status: Former Smoker     Types: Cigarettes     Quit date: 9/14/2008   • Smokeless tobacco: Never Used   • Alcohol use No       ROS:  Review of Systems:  General: no recent weight loss/gain, weakness or  "fatigue  Skin: no rashes, lumps, or other skin changes  HEENT: no dizziness, lightheadedness, or vision changes  Respiratory: no cough or hemoptysis  Cardiovascular: + palpitations, and tachycardia  Gastrointestinal: no black/tarry stools or diarrhea  Urinary: no change in frequency or urgency  Peripheral Vascular: no claudication or leg cramps  Musculoskeletal: no muscle or joint pain/stiffness  Psychiatric: no depression or excessive stress  Neurological: no sensory or motor loss, no syncope  Hematologic: no anemia, easy bruising or bleeding  Endocrine: no thyroid problems, nor heat or cold intolerance       PHYSICAL EXAM:   /88 (BP Location: Left arm, Patient Position: Sitting)   Pulse 59   Ht 160 cm (63\")   Wt 107 kg (235 lb)   BMI 41.63 kg/m²      Wt Readings from Last 5 Encounters:   10/30/18 107 kg (235 lb)   07/24/18 108 kg (237 lb)   11/21/17 108 kg (238 lb)   03/15/17 103 kg (227 lb)   09/14/16 101 kg (223 lb)     BP Readings from Last 5 Encounters:   10/30/18 128/88   07/24/18 128/88   11/21/17 140/90   03/15/17 162/94   09/14/16 140/98       General-Well Nourished, Well developed  Eyes - PERRLA  Neck- supple, No mass  CV- regular rate and rhythm, no MRG  Lung- clear bilaterally  Abd- soft, +BS  Musc/skel - Norm strength and range of motion  Skin- warm and dry  Neuro - Alert & Oriented x 3, appropriate mood.    Medical problems and test results were reviewed with the patient today.     Results for orders placed or performed during the hospital encounter of 08/01/18   Adult Transthoracic Echo Complete W/ Cont if Necessary Per Protocol   Result Value Ref Range    Target HR (85%) 133 bpm    Max. Pred. HR (100%) 156 bpm         No results found for: CHOL, HDL, HDLC, LDL, LDLC, VLDL    EKG:  (EKG/Tracing has been independently visualized by me and summarized below)      ECG 12 Lead  Date/Time: 10/30/2018 4:20 PM  Performed by: BERONICA ALLEN  Authorized by: BERONICA ALLEN   Rhythm: sinus " rhythm  Rate: normal  BPM: 60  Conduction: incomplete RBBB  QRS axis: normal  Clinical impression: normal ECG            ASSESSMENT and PLAN  1.  Atrial flutter-discussed with the patient therapy options. Plan for A. Flutter Ablation. We discussed potential Electrophysiology Study with possible ablation.  I described the procedures in detail including risks, alternatives, and benefits.  We also discussed that risks include bleeding, vascular damage, stroke, MI, esophageal damage, cardiac perforation, and even death.  2. Atrial Fibrillation - Continue Multaq    Return for after procedure.    1. Atrial Flutter Ablation        Phani Kidd M.D., F.LAUREL.C.C, F.H.R.S.  Cardiology/Electrophysiology  10/30/18  4:21 PM

## 2018-11-06 ENCOUNTER — TELEPHONE (OUTPATIENT)
Dept: CARDIOLOGY | Facility: CLINIC | Age: 63
End: 2018-11-06

## 2018-11-06 NOTE — TELEPHONE ENCOUNTER
Pt called, states she feels like the Multaq could be causing some joint pain and fatigue.Pt has ablation scheduled for 11/15/18. She is going to call Dr Kidd's nurse and let them know.

## 2018-11-13 ENCOUNTER — TELEPHONE (OUTPATIENT)
Dept: CARDIOLOGY | Facility: CLINIC | Age: 63
End: 2018-11-13

## 2018-11-13 ENCOUNTER — APPOINTMENT (OUTPATIENT)
Dept: PREADMISSION TESTING | Facility: HOSPITAL | Age: 63
End: 2018-11-13

## 2018-11-13 DIAGNOSIS — I48.3 TYPICAL ATRIAL FLUTTER (HCC): ICD-10-CM

## 2018-11-13 LAB
ALBUMIN SERPL-MCNC: 4.09 G/DL (ref 3.2–4.8)
ALBUMIN/GLOB SERPL: 1.8 G/DL (ref 1.5–2.5)
ALP SERPL-CCNC: 65 U/L (ref 25–100)
ALT SERPL W P-5'-P-CCNC: 16 U/L (ref 7–40)
ANION GAP SERPL CALCULATED.3IONS-SCNC: 5 MMOL/L (ref 3–11)
AST SERPL-CCNC: 19 U/L (ref 0–33)
BILIRUB SERPL-MCNC: 0.4 MG/DL (ref 0.3–1.2)
BUN BLD-MCNC: 14 MG/DL (ref 9–23)
BUN/CREAT SERPL: 14.4 (ref 7–25)
CALCIUM SPEC-SCNC: 8.3 MG/DL (ref 8.7–10.4)
CHLORIDE SERPL-SCNC: 108 MMOL/L (ref 99–109)
CO2 SERPL-SCNC: 26 MMOL/L (ref 20–31)
CREAT BLD-MCNC: 0.97 MG/DL (ref 0.6–1.3)
DEPRECATED RDW RBC AUTO: 49.8 FL (ref 37–54)
ERYTHROCYTE [DISTWIDTH] IN BLOOD BY AUTOMATED COUNT: 14.5 % (ref 11.3–14.5)
GFR SERPL CREATININE-BSD FRML MDRD: 58 ML/MIN/1.73
GLOBULIN UR ELPH-MCNC: 2.3 GM/DL
GLUCOSE BLD-MCNC: 74 MG/DL (ref 70–100)
HCT VFR BLD AUTO: 41.3 % (ref 34.5–44)
HGB BLD-MCNC: 13.4 G/DL (ref 11.5–15.5)
MCH RBC QN AUTO: 30.4 PG (ref 27–31)
MCHC RBC AUTO-ENTMCNC: 32.4 G/DL (ref 32–36)
MCV RBC AUTO: 93.7 FL (ref 80–99)
PLATELET # BLD AUTO: 269 10*3/MM3 (ref 150–450)
PMV BLD AUTO: 10.9 FL (ref 6–12)
POTASSIUM BLD-SCNC: 4.2 MMOL/L (ref 3.5–5.5)
PROT SERPL-MCNC: 6.4 G/DL (ref 5.7–8.2)
RBC # BLD AUTO: 4.41 10*6/MM3 (ref 3.89–5.14)
SODIUM BLD-SCNC: 139 MMOL/L (ref 132–146)
WBC NRBC COR # BLD: 7.48 10*3/MM3 (ref 3.5–10.8)

## 2018-11-13 PROCEDURE — 80053 COMPREHEN METABOLIC PANEL: CPT | Performed by: INTERNAL MEDICINE

## 2018-11-13 PROCEDURE — 36415 COLL VENOUS BLD VENIPUNCTURE: CPT

## 2018-11-13 PROCEDURE — 85027 COMPLETE CBC AUTOMATED: CPT | Performed by: INTERNAL MEDICINE

## 2018-11-15 ENCOUNTER — HOSPITAL ENCOUNTER (OUTPATIENT)
Facility: HOSPITAL | Age: 63
Discharge: HOME | End: 2018-11-16
Attending: INTERNAL MEDICINE | Admitting: INTERNAL MEDICINE

## 2018-11-15 DIAGNOSIS — I48.3 TYPICAL ATRIAL FLUTTER (HCC): ICD-10-CM

## 2018-11-15 PROCEDURE — S0260 H&P FOR SURGERY: HCPCS | Performed by: INTERNAL MEDICINE

## 2018-11-15 PROCEDURE — 25010000002 FENTANYL CITRATE (PF) 100 MCG/2ML SOLUTION: Performed by: INTERNAL MEDICINE

## 2018-11-15 PROCEDURE — C1733 CATH, EP, OTHR THAN COOL-TIP: HCPCS | Performed by: INTERNAL MEDICINE

## 2018-11-15 PROCEDURE — 93621 COMP EP EVL L PAC&REC C SINS: CPT | Performed by: INTERNAL MEDICINE

## 2018-11-15 PROCEDURE — 99152 MOD SED SAME PHYS/QHP 5/>YRS: CPT | Performed by: INTERNAL MEDICINE

## 2018-11-15 PROCEDURE — 93609 INTRA-VNTR MAPG TCHYCAR SITE: CPT | Performed by: INTERNAL MEDICINE

## 2018-11-15 PROCEDURE — 93010 ELECTROCARDIOGRAM REPORT: CPT | Performed by: INTERNAL MEDICINE

## 2018-11-15 PROCEDURE — C1731 CATH, EP, 20 OR MORE ELEC: HCPCS | Performed by: INTERNAL MEDICINE

## 2018-11-15 PROCEDURE — 25010000002 MIDAZOLAM PER 1 MG: Performed by: INTERNAL MEDICINE

## 2018-11-15 PROCEDURE — C1894 INTRO/SHEATH, NON-LASER: HCPCS | Performed by: INTERNAL MEDICINE

## 2018-11-15 PROCEDURE — 93653 COMPRE EP EVAL TX SVT: CPT | Performed by: INTERNAL MEDICINE

## 2018-11-15 PROCEDURE — C1730 CATH, EP, 19 OR FEW ELECT: HCPCS | Performed by: INTERNAL MEDICINE

## 2018-11-15 PROCEDURE — 93005 ELECTROCARDIOGRAM TRACING: CPT | Performed by: INTERNAL MEDICINE

## 2018-11-15 PROCEDURE — C1732 CATH, EP, DIAG/ABL, 3D/VECT: HCPCS | Performed by: INTERNAL MEDICINE

## 2018-11-15 PROCEDURE — G0378 HOSPITAL OBSERVATION PER HR: HCPCS

## 2018-11-15 RX ORDER — DILTIAZEM HYDROCHLORIDE 120 MG/1
120 CAPSULE, COATED, EXTENDED RELEASE ORAL DAILY
Status: DISCONTINUED | OUTPATIENT
Start: 2018-11-16 | End: 2018-11-16 | Stop reason: HOSPADM

## 2018-11-15 RX ORDER — ACETAMINOPHEN 650 MG/1
650 SUPPOSITORY RECTAL EVERY 4 HOURS PRN
Status: DISCONTINUED | OUTPATIENT
Start: 2018-11-15 | End: 2018-11-16 | Stop reason: HOSPADM

## 2018-11-15 RX ORDER — FAMOTIDINE 20 MG/1
40 TABLET, FILM COATED ORAL NIGHTLY
Status: DISCONTINUED | OUTPATIENT
Start: 2018-11-15 | End: 2018-11-16 | Stop reason: HOSPADM

## 2018-11-15 RX ORDER — OXYCODONE AND ACETAMINOPHEN 7.5; 325 MG/1; MG/1
2 TABLET ORAL EVERY 4 HOURS PRN
Status: DISCONTINUED | OUTPATIENT
Start: 2018-11-15 | End: 2018-11-16 | Stop reason: HOSPADM

## 2018-11-15 RX ORDER — FENTANYL CITRATE 50 UG/ML
INJECTION, SOLUTION INTRAMUSCULAR; INTRAVENOUS AS NEEDED
Status: DISCONTINUED | OUTPATIENT
Start: 2018-11-15 | End: 2018-11-15 | Stop reason: HOSPADM

## 2018-11-15 RX ORDER — IBUPROFEN 400 MG/1
400 TABLET ORAL EVERY 6 HOURS PRN
Status: DISCONTINUED | OUTPATIENT
Start: 2018-11-15 | End: 2018-11-16 | Stop reason: HOSPADM

## 2018-11-15 RX ORDER — LIDOCAINE HYDROCHLORIDE 10 MG/ML
INJECTION, SOLUTION INFILTRATION; PERINEURAL AS NEEDED
Status: DISCONTINUED | OUTPATIENT
Start: 2018-11-15 | End: 2018-11-15 | Stop reason: HOSPADM

## 2018-11-15 RX ORDER — MIDAZOLAM HYDROCHLORIDE 1 MG/ML
INJECTION INTRAMUSCULAR; INTRAVENOUS AS NEEDED
Status: DISCONTINUED | OUTPATIENT
Start: 2018-11-15 | End: 2018-11-15 | Stop reason: HOSPADM

## 2018-11-15 RX ORDER — LISINOPRIL 5 MG/1
5 TABLET ORAL DAILY
Status: DISCONTINUED | OUTPATIENT
Start: 2018-11-15 | End: 2018-11-16 | Stop reason: HOSPADM

## 2018-11-15 RX ORDER — ACETAMINOPHEN 325 MG/1
650 TABLET ORAL EVERY 4 HOURS PRN
Status: DISCONTINUED | OUTPATIENT
Start: 2018-11-15 | End: 2018-11-16 | Stop reason: HOSPADM

## 2018-11-15 RX ORDER — SERTRALINE HYDROCHLORIDE 25 MG/1
25 TABLET, FILM COATED ORAL EVERY MORNING
Status: DISCONTINUED | OUTPATIENT
Start: 2018-11-16 | End: 2018-11-16 | Stop reason: HOSPADM

## 2018-11-15 RX ORDER — OXYCODONE HYDROCHLORIDE AND ACETAMINOPHEN 5; 325 MG/1; MG/1
1 TABLET ORAL EVERY 4 HOURS PRN
Status: DISCONTINUED | OUTPATIENT
Start: 2018-11-15 | End: 2018-11-16 | Stop reason: HOSPADM

## 2018-11-15 RX ORDER — ACETAMINOPHEN 160 MG/5ML
650 SOLUTION ORAL EVERY 4 HOURS PRN
Status: DISCONTINUED | OUTPATIENT
Start: 2018-11-15 | End: 2018-11-16 | Stop reason: HOSPADM

## 2018-11-15 RX ORDER — LEVOTHYROXINE SODIUM 0.05 MG/1
50 TABLET ORAL EVERY MORNING
Status: DISCONTINUED | OUTPATIENT
Start: 2018-11-16 | End: 2018-11-16 | Stop reason: HOSPADM

## 2018-11-15 RX ADMIN — DRONEDARONE 400 MG: 400 TABLET, FILM COATED ORAL at 17:53

## 2018-11-15 RX ADMIN — ACETAMINOPHEN 650 MG: 325 TABLET ORAL at 13:59

## 2018-11-15 RX ADMIN — APIXABAN 5 MG: 5 TABLET, FILM COATED ORAL at 22:58

## 2018-11-15 RX ADMIN — FAMOTIDINE 40 MG: 20 TABLET ORAL at 22:57

## 2018-11-15 RX ADMIN — ACETAMINOPHEN 650 MG: 325 TABLET ORAL at 17:52

## 2018-11-15 NOTE — PLAN OF CARE
"Problem: Patient Care Overview  Goal: Plan of Care Review  Outcome: Ongoing (interventions implemented as appropriate)   11/15/18 1400   Coping/Psychosocial   Plan of Care Reviewed With family;patient   Plan of Care Review   Progress improving   OTHER   Outcome Summary Pt tx to CVOU from EP lab post Ablation. Pt VSS, L & R groin sites, clean, soft, dry & intact. 6 hrs bedrest.  Pt c/o chest pressure, Dr. Kidd notified, \"pain expected post procedure\". PRN pain meds available & administered. Will continue to monitor.           "

## 2018-11-15 NOTE — PROCEDURES
PRE-ELECTROPHYSIOLOGY STUDY DIAGNOSES  1. Typical atrial flutter.     PROCEDURE PERFORMED  1. Electrophysiology testing with right-sided atrial flutter ablation.  2. Left atrial pacing recording from the coronary sinus.  3. Interatrial mapping.    Anesthesia:    I was present with the patient for the duration of moderate sedation and supervised staff who had no other duties and monitored the patient for the entire procedure     Name of independent trained observer: Jo Miranda RN  Intra-Service start time: 1201  Intra-Service end time: 1258    Estimated Blood Loss: Less than 10 mL     Specimens: None     PROCEDURE IN DETAIL: The patient was brought into the EP lab in a fasting  state. The right and left groins were prepped and draped in the usual  sterile fashion. Access was obtained in the right femoral vein via the  Seldinger technique over which an 8 and 7-Togolese sheath was placed.  Access was obtained in the left femoral vein via the Seldinger technique  over which a 5-Togolese sheath was placed. Through the 7-Togolese sheath, a  Halo mapping catheter was placed in the high right atrium. Through the  5-Togolese sheath, a 5-Togolese catheter was placed at the RV apex. Through  the 8-Togolese sheath, a large curved 8 mm Blazer II ablation catheter was  placed in the coronary sinus. Conduction time was checked across the right-sided isthmus. A Right-sided atrial  flutter line was then performed using 70 W of energy, 60 degree heat for  120 seconds. Two lines were placed.  Ablation  catheter was then placed back into the coronary sinus. Pacing from the  coronary sinus showed bidirectional block across the tricuspid annulus.  The His bundle was then mapped out and formal electrophysiology test was  performed.     1. Baseline rhythm showed normal sinus heart rhythm with an R-R interval of 856,   2. ME interval of 157,   3. QRS of 73,   4. QT of 397,   5. AH of 119,   6. HV of 50.    7. Sinus node recovery time at 600 was 1284  at 400 was 1094.   8. The AV Wenckebach cycle length was 340.   9. AV node refractory period at 600 was 220, at 400 was 230  10. The VA Wenckebach cycle length was 440.   11. VA node refractory period at 600 was <250,   12. The ventricular effective refractory period at 600 was 250, at 400 was 230.     The sheaths were then pulled. Hemostasis was achieved. The patient recovered from his sedation, transferred from the lab in a stable condition.     IMPRESSION: Successful catheter mapping ablation of right-sided  isthmus-dependent atrial flutter substrate

## 2018-11-15 NOTE — H&P
"Dunia Eduardo  1955  PCP: Christopher Villalobos MD    SUBJECTIVE:   Dunia Eduardo is a 62 y.o. female seen for a consultation visit regarding the following:     Chief Complaint:   No chief complaint on file.         Consultation is requested by Karely Avina MD for evaluation of No chief complaint on file.        History:  This is a 62 y.o. female followed by Dr. Avina.  She has history of atrial fibrillation for which she underwent cardioversion in January 2015.  She also has a normal LV function and mild pulmonary hypertension. She saw Dr. Avina in Oct 2018 and found to have A. Flutter. She underwent a CV. In the A. Flutter she was dizziness, lightheadedness and near-syncopal episode. She had been on Flecainide in the past but now is on Multaq. Developed GI upset on Pradaxa. Had difficulty controling her HR in the ED with meds, therefore, required Cardioversion.       Cardiac PMH: (Old records have been reviewed and summarized below)  1. Atrial Fibrillation - Treated with Flecainide since 2015. Changed to Multaq in Oct 2018  2. Atrial Flutter  3. Hypothyroid    Past Medical History, Past Surgical History, Family history, Social History, and Medications were all reviewed with the patient today and updated as necessary.     No current facility-administered medications for this encounter.     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 BP-178/90. Pt had CP. Negative    • CARDIOVASCULAR STRESS TEST  08/01/2018    (Mod) 9 Min. 4.6 METS. 76% THR. BP- 197/97. Negative.   • CARDIOVERSION  01/23/2015    Converted    • CARDIOVERSION  10/10/2018    Converted to Sinus   • CHOLECYSTECTOMY     • ECHO - CONVERTED  08/16/2012    EF 65%. RVSP-41mmHg. R/O PFO   • ECHO - CONVERTED  09/24/2012    small PFO   • ECHO - CONVERTED  01/16/2015    EF 65%   • ECHO - CONVERTED  08/01/2018    EF 65%. RVSP- 40 mmHg.   • HYSTERECTOMY     • OTHER SURGICAL HISTORY  12/2011    cerebral angiogram, [facility]   • OTHER SURGICAL HISTORY  2012    Cardionet-PAF   • OTHER SURGICAL HISTORY  11/02/2012    small PFO     Family History   Problem Relation Age of Onset   • Heart failure Mother    • Heart failure Father    • Hypertension Brother    • Heart attack Other    • Heart disease Other    • Heart failure Other    • Diabetes Other    • Hypertension Other    • Clotting disorder Other      Social History     Tobacco Use   • Smoking status: Former Smoker     Types: Cigarettes     Last attempt to quit: 9/14/2008     Years since quitting: 10.1   • Smokeless tobacco: Never Used   Substance Use Topics   • Alcohol use: No            PHYSICAL EXAM:   BP (!) 152/131 (BP Location: Left arm, Patient Position: Lying)   Pulse 65   Temp 97.6 °F (36.4 °C) (Tympanic)   Resp 16   Ht 160 cm (63\")   Wt 106 kg (233 lb 14.5 oz)   SpO2 98%   BMI 41.43 kg/m²      Wt Readings from Last 5 Encounters:   11/15/18 106 kg (233 lb 14.5 oz)   10/30/18 107 kg (235 lb)   07/24/18 108 kg (237 lb)   11/21/17 108 kg (238 lb)   03/15/17 103 kg (227 lb)     BP Readings from Last 5 Encounters:   11/15/18 (!) 152/131   10/30/18 " 128/88   07/24/18 128/88   11/21/17 140/90   03/15/17 162/94       General-Well Nourished, Well developed  Eyes - PERRLA  Neck- supple, No mass  CV- regular rate and rhythm, no MRG  Lung- clear bilaterally  Abd- soft, +BS  Musc/skel - Norm strength and range of motion  Skin- warm and dry  Neuro - Alert & Oriented x 3, appropriate mood.    Medical problems and test results were reviewed with the patient today.     Results for orders placed or performed in visit on 11/13/18   CBC (No Diff)   Result Value Ref Range    WBC 7.48 3.50 - 10.80 10*3/mm3    RBC 4.41 3.89 - 5.14 10*6/mm3    Hemoglobin 13.4 11.5 - 15.5 g/dL    Hematocrit 41.3 34.5 - 44.0 %    MCV 93.7 80.0 - 99.0 fL    MCH 30.4 27.0 - 31.0 pg    MCHC 32.4 32.0 - 36.0 g/dL    RDW 14.5 11.3 - 14.5 %    RDW-SD 49.8 37.0 - 54.0 fl    MPV 10.9 6.0 - 12.0 fL    Platelets 269 150 - 450 10*3/mm3   Comprehensive Metabolic Panel   Result Value Ref Range    Glucose 74 70 - 100 mg/dL    BUN 14 9 - 23 mg/dL    Creatinine 0.97 0.60 - 1.30 mg/dL    Sodium 139 132 - 146 mmol/L    Potassium 4.2 3.5 - 5.5 mmol/L    Chloride 108 99 - 109 mmol/L    CO2 26.0 20.0 - 31.0 mmol/L    Calcium 8.3 (L) 8.7 - 10.4 mg/dL    Total Protein 6.4 5.7 - 8.2 g/dL    Albumin 4.09 3.20 - 4.80 g/dL    ALT (SGPT) 16 7 - 40 U/L    AST (SGOT) 19 0 - 33 U/L    Alkaline Phosphatase 65 25 - 100 U/L    Total Bilirubin 0.4 0.3 - 1.2 mg/dL    eGFR Non African Amer 58 (L) >60 mL/min/1.73    Globulin 2.3 gm/dL    A/G Ratio 1.8 1.5 - 2.5 g/dL    BUN/Creatinine Ratio 14.4 7.0 - 25.0    Anion Gap 5.0 3.0 - 11.0 mmol/L         No results found for: CHOL, HDL, HDLC, LDL, LDLC, VLDL    EKG:  (EKG/Tracing has been independently visualized by me and summarized below)    Procedures    ASSESSMENT and PLAN  1.  Atrial flutter-discussed with the patient therapy options. Plan for A. Flutter Ablation. We discussed potential Electrophysiology Study with possible ablation.  I described the procedures in detail including  risks, alternatives, and benefits.  We also discussed that risks include bleeding, vascular damage, stroke, MI, esophageal damage, cardiac perforation, and even death.  2. Atrial Fibrillation - Continue Multaq      1. Atrial Flutter Ablation        Phani Kidd M.D., FSHANTHIC, F.H.R.S.  Cardiology/Electrophysiology  11/15/18  11:13 AM

## 2018-11-16 VITALS
SYSTOLIC BLOOD PRESSURE: 126 MMHG | BODY MASS INDEX: 41.45 KG/M2 | TEMPERATURE: 97.6 F | WEIGHT: 233.91 LBS | HEART RATE: 56 BPM | OXYGEN SATURATION: 95 % | RESPIRATION RATE: 14 BRPM | HEIGHT: 63 IN | DIASTOLIC BLOOD PRESSURE: 74 MMHG

## 2018-11-16 PROCEDURE — G0378 HOSPITAL OBSERVATION PER HR: HCPCS

## 2018-11-16 PROCEDURE — 99213 OFFICE O/P EST LOW 20 MIN: CPT | Performed by: PHYSICIAN ASSISTANT

## 2018-11-16 RX ORDER — FLECAINIDE ACETATE 50 MG/1
50 TABLET ORAL 2 TIMES DAILY
Qty: 60 TABLET | Refills: 11 | Status: SHIPPED | OUTPATIENT
Start: 2018-11-19 | End: 2019-10-14 | Stop reason: SDUPTHER

## 2018-11-16 RX ADMIN — SERTRALINE HYDROCHLORIDE 25 MG: 25 TABLET ORAL at 05:38

## 2018-11-16 RX ADMIN — DILTIAZEM HYDROCHLORIDE 120 MG: 120 CAPSULE, COATED, EXTENDED RELEASE ORAL at 09:03

## 2018-11-16 RX ADMIN — LEVOTHYROXINE SODIUM 50 MCG: 50 TABLET ORAL at 05:37

## 2018-11-16 RX ADMIN — LISINOPRIL 5 MG: 5 TABLET ORAL at 09:02

## 2018-11-16 NOTE — PLAN OF CARE
Problem: Patient Care Overview  Goal: Plan of Care Review  Outcome: Ongoing (interventions implemented as appropriate)   11/16/18 0153   Coping/Psychosocial   Plan of Care Reviewed With patient   Plan of Care Review   Progress improving   OTHER   Outcome Summary V/S stable with sinus hernando rhythm. Procedural sites intact with tegaderm covering. No bleeding or swelling noted @ sites.       Problem: Cardiac Catheterization (Diagnostic/Interventional) (Adult)  Goal: Signs and Symptoms of Listed Potential Problems Will be Absent, Minimized or Managed (Cardiac Catheterization)  Outcome: Ongoing (interventions implemented as appropriate)   11/16/18 0153   Goal/Outcome Evaluation   Problems Assessed (Cardiac Catheterization) all   Problems Present (Cardiac Cath) none     Goal: Anesthesia/Sedation Recovery  Outcome: Ongoing (interventions implemented as appropriate)   11/16/18 0153   Goal/Outcome Evaluation   Anesthesia/Sedation Recovery progressing toward baseline

## 2018-11-16 NOTE — PLAN OF CARE
Problem: Patient Care Overview  Goal: Plan of Care Review  Outcome: Ongoing (interventions implemented as appropriate)   11/16/18 3036   Coping/Psychosocial   Plan of Care Reviewed With patient;spouse   Plan of Care Review   Progress improving   OTHER   Outcome Summary VSS, NO C/O PAIN, READY FOR DC HOME.     Goal: Individualization and Mutuality  Outcome: Ongoing (interventions implemented as appropriate)    Goal: Discharge Needs Assessment  Outcome: Ongoing (interventions implemented as appropriate)    Goal: Interprofessional Rounds/Family Conf  Outcome: Ongoing (interventions implemented as appropriate)      Problem: Cardiac Catheterization (Diagnostic/Interventional) (Adult)  Goal: Anesthesia/Sedation Recovery  Outcome: Ongoing (interventions implemented as appropriate)

## 2018-11-16 NOTE — PROGRESS NOTES
CARDIOLOGY PROGRESS NOTE           11/16/2018 8:09 AM    Admit Date: 11/15/2018    Admit Diagnosis: Typical atrial flutter (CMS/HCC) [I48.3]  Typical atrial flutter (CMS/HCC) [I48.3]    Chief Compliant: Follow up for aflutter     Subjective:   Patient's status has been stable overnight. She has ambulated and voided with no issues.       Objective:     Vitals:    11/16/18 0200 11/16/18 0400 11/16/18 0600 11/16/18 0730   BP: 107/68 101/73 141/85 126/74   Pulse: 54 51 55 56   Resp:       Temp:       TempSrc:       SpO2: 94% 94% 94% 95%   Weight:       Height:           Physical Exam:  General-Well Nourished, Well developed  Eyes - PERRLA  Neck- supple, No mass  CV- regular rate and rhythm, no MRG  Lung- clear bilaterally  Abd- soft, +BS  Musc/skel - Norm strength and range of motion; bilateral groins with no hematoma.   Skin- warm and dry  Neuro - Alert & Oriented x 3, appropriate mood.      Current Facility-Administered Medications:   •  acetaminophen (TYLENOL) tablet 650 mg, 650 mg, Oral, Q4H PRN, 650 mg at 11/15/18 1752 **OR** acetaminophen (TYLENOL) 160 MG/5ML solution 650 mg, 650 mg, Oral, Q4H PRN **OR** acetaminophen (TYLENOL) suppository 650 mg, 650 mg, Rectal, Q4H PRN, Phani Kidd MD  •  apixaban (ELIQUIS) tablet 5 mg, 5 mg, Oral, BID, Phani Kidd MD, 5 mg at 11/15/18 2258  •  diltiaZEM CD (CARDIZEM CD) 24 hr capsule 120 mg, 120 mg, Oral, Daily, Phani Kidd MD  •  dronedarone (MULTAQ) tablet 400 mg, 400 mg, Oral, BID With Meals, Phani Kidd MD, 400 mg at 11/15/18 1753  •  famotidine (PEPCID) tablet 40 mg, 40 mg, Oral, Nightly, Phani Kidd MD, 40 mg at 11/15/18 2257  •  ibuprofen (ADVIL,MOTRIN) tablet 400 mg, 400 mg, Oral, Q6H PRN, Phani Kidd MD  •  levothyroxine (SYNTHROID, LEVOTHROID) tablet 50 mcg, 50 mcg, Oral, QAM, Phani Kidd MD, 50 mcg at 11/16/18 0537  •  lisinopril (PRINIVIL,ZESTRIL) tablet 5 mg, 5 mg, Oral, Daily, Phani Kidd MD  •   oxyCODONE-acetaminophen (PERCOCET) 5-325 MG per tablet 1 tablet, 1 tablet, Oral, Q4H PRN, Phani Kidd MD  •  oxyCODONE-acetaminophen (PERCOCET) 7.5-325 MG per tablet 2 tablet, 2 tablet, Oral, Q4H PRN, Phani Kidd MD  •  sertraline (ZOLOFT) tablet 25 mg, 25 mg, Oral, QAM, Phani Kidd MD, 25 mg at 11/16/18 0538    Data Review:   No results found for this or any previous visit (from the past 24 hour(s)).  Assessment:       Typical atrial flutter (CMS/HCC)    Plan:   1. Typical Atrial Flutter   -s/p successful catheter ablation of right-sided atrial flutter.   -Continue Eliquis 5mg BID for anticoagulation     2. PAF   - recently changed to Multaq for recurrent aflutter. Now with significant fatigue. Will change back to Flecainide 50mg BID as she was previously well maintained. Instructed patient to begin on Monday.   - Eliquis 5mg BID for stroke prophylaxis.       Patient will be discharged home in stable condition. Follow-up in 6-8 weeks.     Electronically signed by AMBIKA Egan, 11/16/18, 8:11 AM.

## 2019-03-26 ENCOUNTER — OFFICE VISIT (OUTPATIENT)
Dept: CARDIOLOGY | Facility: CLINIC | Age: 64
End: 2019-03-26

## 2019-03-26 VITALS
HEIGHT: 63 IN | BODY MASS INDEX: 40.93 KG/M2 | WEIGHT: 231 LBS | HEART RATE: 59 BPM | DIASTOLIC BLOOD PRESSURE: 96 MMHG | SYSTOLIC BLOOD PRESSURE: 184 MMHG

## 2019-03-26 DIAGNOSIS — R00.2 PALPITATIONS: ICD-10-CM

## 2019-03-26 DIAGNOSIS — E78.00 HYPERCHOLESTEREMIA: ICD-10-CM

## 2019-03-26 DIAGNOSIS — I10 ESSENTIAL HYPERTENSION: ICD-10-CM

## 2019-03-26 DIAGNOSIS — R73.9 HYPERGLYCEMIA: ICD-10-CM

## 2019-03-26 DIAGNOSIS — R07.2 PRECORDIAL PAIN: ICD-10-CM

## 2019-03-26 DIAGNOSIS — E03.9 ACQUIRED HYPOTHYROIDISM: ICD-10-CM

## 2019-03-26 DIAGNOSIS — E83.42 HYPOMAGNESEMIA: ICD-10-CM

## 2019-03-26 DIAGNOSIS — E88.81 METABOLIC SYNDROME: ICD-10-CM

## 2019-03-26 DIAGNOSIS — I48.0 PAF (PAROXYSMAL ATRIAL FIBRILLATION) (HCC): ICD-10-CM

## 2019-03-26 DIAGNOSIS — I48.3 TYPICAL ATRIAL FLUTTER (HCC): ICD-10-CM

## 2019-03-26 DIAGNOSIS — R06.02 SHORTNESS OF BREATH: ICD-10-CM

## 2019-03-26 PROCEDURE — 93000 ELECTROCARDIOGRAM COMPLETE: CPT | Performed by: INTERNAL MEDICINE

## 2019-03-26 PROCEDURE — 99214 OFFICE O/P EST MOD 30 MIN: CPT | Performed by: INTERNAL MEDICINE

## 2019-03-26 RX ORDER — HYDROCHLOROTHIAZIDE 25 MG/1
25 TABLET ORAL DAILY
Qty: 30 TABLET | Refills: 11 | Status: SHIPPED | OUTPATIENT
Start: 2019-03-26 | End: 2019-10-14 | Stop reason: SINTOL

## 2019-03-26 RX ORDER — LISINOPRIL 10 MG/1
10 TABLET ORAL DAILY
Qty: 30 TABLET | Refills: 11 | Status: SHIPPED | OUTPATIENT
Start: 2019-03-26 | End: 2019-10-14 | Stop reason: SDUPTHER

## 2019-03-26 NOTE — PROGRESS NOTES
Chief Complaint   Patient presents with   • Follow-up     for cardiac management   • Ablation     Dr Kidd, November 2018   • Chest Pain     randomly occurs, with or without exertion, SOB noted, same symptoms after waking up from ablation.    • Atrial Fibrillation     has felt like she might be back in Afib this morning, pressure across chest, SOB noted.    • Labs     no labs checked since ablation   • Aspirin     does not take a daily aspirin, currently on eliquis       CARDIAC COMPLAINTS  chest pressure/discomfort, dyspnea, fatigue and palpitations        Subjective   Dunia Eduardo is a 63 y.o. female came in today for her follow-up visit.  She has history of hypertension, hypercholesterolemia who also has history of PAF and later atrial flutter.  She was referred to an electrophysiologist and underwent ablation of the atrial flutter.  She continued the antiarrhythmic medication as well as the anticoagulant.  She came today stating that she has been noticing chest pain which occurs randomly associated with shortness of breath.  It is mostly in the center part of the chest without any radiation.  It can last from a few seconds to a few minutes.  She is not sure when was the last time she had any labs done.              Cardiac History  Past Surgical History:   Procedure Laterality Date   • CARDIAC ELECTROPHYSIOLOGY PROCEDURE N/A 11/15/2018    Ablation atrial flutter;  Surgeon: Phani Kidd MD;   • CARDIOVASCULAR STRESS TEST  09/24/2012    5 Min, 85% THR. BP-178/90. Pt had CP. Negative    • CARDIOVASCULAR STRESS TEST  08/01/2018    (Mod) 9 Min. 4.6 METS. 76% THR. BP- 197/97. Negative.   • CARDIOVERSION  01/23/2015    Converted    • CARDIOVERSION  10/10/2018    Converted to Sinus   • CHOLECYSTECTOMY     • ECHO - CONVERTED  08/16/2012    EF 65%. RVSP-41mmHg. R/O PFO   • ECHO - CONVERTED  09/24/2012    small PFO   • ECHO - CONVERTED  01/16/2015    EF 65%   • ECHO - CONVERTED  08/01/2018    EF 65%. RVSP- 40  mmHg.   • HYSTERECTOMY     • OTHER SURGICAL HISTORY  12/2011    cerebral angiogram, [facility]   • OTHER SURGICAL HISTORY  2012    Cardionet-PAF   • OTHER SURGICAL HISTORY  11/02/2012    small PFO       Current Outpatient Medications   Medication Sig Dispense Refill   • apixaban (ELIQUIS) 5 MG tablet tablet Take 1 tablet by mouth 2 (Two) Times a Day. (Patient taking differently: Take 5 mg by mouth 2 (Two) Times a Day.) 180 tablet 4   • Cholecalciferol (VITAMIN D3 PO) Take 50,000 Units by mouth 1 (One) Time Per Week.     • diltiaZEM CD (CARDIZEM CD) 120 MG 24 hr capsule Take 1 capsule by mouth Daily. (Patient taking differently: Take 120 mg by mouth Every Morning.) 90 capsule 4   • flecainide (TAMBOCOR) 50 MG tablet Take 1 tablet by mouth 2 (Two) Times a Day. 60 tablet 11   • levothyroxine (SYNTHROID, LEVOTHROID) 50 MCG tablet Take 50 mcg by mouth Every Morning.     • raNITIdine (ZANTAC) 300 MG tablet Take 1 tablet by mouth Every Night. 90 tablet 4   • sertraline (ZOLOFT) 25 MG tablet Take 25 mg by mouth Every Morning.     • hydrochlorothiazide (HYDRODIURIL) 25 MG tablet Take 1 tablet by mouth Daily. 30 tablet 11   • lisinopril (PRINIVIL,ZESTRIL) 10 MG tablet Take 1 tablet by mouth Daily. 30 tablet 11     No current facility-administered medications for this visit.        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   • Atrial flutter (CMS/HCC)    • Gallbladder removed    • GERD (gastroesophageal reflux disease)    • Heart murmur    • Hx of cholecystectomy    • Hx of hysterectomy    • Hypercalcemia    • Hyperlipidemia    • Hypertension    • Hypothyroidism    • PAF (paroxysmal atrial fibrillation) (CMS/HCC)    • PONV (postoperative nausea and vomiting)    • Wears glasses    •  "Wears partial dentures        Social History     Socioeconomic History   • Marital status:      Spouse name: Not on file   • Number of children: Not on file   • Years of education: Not on file   • Highest education level: Not on file   Tobacco Use   • Smoking status: Former Smoker     Types: Cigarettes     Last attempt to quit: 9/14/2008     Years since quitting: 10.5   • Smokeless tobacco: Never Used   Substance and Sexual Activity   • Alcohol use: No   • Drug use: No   • Sexual activity: Defer       Family History   Problem Relation Age of Onset   • Heart failure Mother    • Heart failure Father    • Hypertension Brother    • Heart attack Other    • Heart disease Other    • Heart failure Other    • Diabetes Other    • Hypertension Other    • Clotting disorder Other        Review of Systems   Constitution: Positive for malaise/fatigue. Negative for decreased appetite.   HENT: Negative for congestion and sore throat.    Eyes: Negative for blurred vision.   Cardiovascular: Positive for chest pain, dyspnea on exertion and palpitations.   Respiratory: Positive for shortness of breath and snoring.    Endocrine: Negative for cold intolerance and heat intolerance.   Hematologic/Lymphatic: Negative for adenopathy. Does not bruise/bleed easily.   Skin: Negative for itching, nail changes and skin cancer.   Musculoskeletal: Positive for arthritis and joint pain. Negative for myalgias.   Gastrointestinal: Negative for abdominal pain, dysphagia and heartburn.   Genitourinary: Negative for bladder incontinence and frequency.   Neurological: Negative for dizziness, light-headedness, seizures and vertigo.   Psychiatric/Behavioral: Negative for altered mental status.   Allergic/Immunologic: Negative for environmental allergies and hives.       Diabetes- No  Thyroid- abnormal    Objective     BP (!) 184/96   Pulse 59   Ht 160 cm (63\")   Wt 105 kg (231 lb)   BMI 40.92 kg/m²     Physical Exam   Constitutional: She is " oriented to person, place, and time. She appears well-developed and well-nourished.   HENT:   Head: Normocephalic.   Nose: Nose normal.   Eyes: EOM are normal. Pupils are equal, round, and reactive to light.   Neck: Normal range of motion. Neck supple.   Cardiovascular: Normal rate, regular rhythm, S1 normal and S2 normal.   Murmur heard.  Pulmonary/Chest: Effort normal and breath sounds normal.   Abdominal: Soft. Bowel sounds are normal.   Musculoskeletal: Normal range of motion. She exhibits no edema.   Neurological: She is alert and oriented to person, place, and time.   Skin: Skin is warm and dry.   Psychiatric: She has a normal mood and affect.       ECG 12 Lead  Date/Time: 3/26/2019 4:05 PM  Performed by: Karely Avina MD  Authorized by: Karely Avina MD   Comparison: compared with previous ECG from 11/15/2018  Comparison to previous ECG: Rate is slower  Rhythm: sinus bradycardia  Rate: bradycardic  Q waves: V1 and V2    QRS axis: normal  Other findings: left ventricular hypertrophy with strain    Clinical impression: abnormal EKG                    Assessment/Plan   Patient's Body mass index is 40.92 kg/m². BMI is above normal parameters. Recommendations include: educational material, exercise counseling and nutrition counseling.     Dunia was seen today for follow-up, ablation, chest pain, atrial fibrillation, labs and aspirin.    Diagnoses and all orders for this visit:    PAF (paroxysmal atrial fibrillation) (CMS/HCC)    Essential hypertension  -     Comprehensive Metabolic Panel; Future    Hypercholesteremia  -     Stress Test With Myocardial Perfusion One Day; Future  -     Lipid Panel; Future    Typical atrial flutter (CMS/HCC)    Acquired hypothyroidism  -     TSH; Future    Hypomagnesemia  -     Magnesium; Future    Metabolic syndrome  -     CBC & Differential; Future    Palpitations  -     Adult Transthoracic Echo Complete W/ Cont if Necessary Per Protocol; Future    Hyperglycemia  -      Hemoglobin A1c; Future    Shortness of breath  -     Adult Transthoracic Echo Complete W/ Cont if Necessary Per Protocol; Future    Precordial pain  -     Stress Test With Myocardial Perfusion One Day; Future  -     High Sensitivity CRP; Future  -     Sedimentation Rate; Future    Other orders  -     lisinopril (PRINIVIL,ZESTRIL) 10 MG tablet; Take 1 tablet by mouth Daily.  -     hydrochlorothiazide (HYDRODIURIL) 25 MG tablet; Take 1 tablet by mouth Daily.       At baseline, she is mildly bradycardic with elevated blood pressure.  EKG showed sinus bradycardia, LVH with Q waves in the anterior leads.  Her clinical examination reveals BMI of 41.  Her cardiovascular examination revealed slightly loud second heart sound and short systolic murmur at the mitral area.  I had a very long talk with her about the weight.  I explained to her that most of the symptoms she has could be related to that.  I also talked to her and her  regarding different diets and gave her papers on Mediterranean diet.  Since is been more than a year, I scheduled her to undergo a repeat stress test to evaluate the LV function and to rule out ischemia.  I also scheduled her to undergo an echocardiogram to evaluate the LV function and valvular structures.  Meanwhile, I increased her dose of lisinopril to 10 mg once a day.  I also added HCTZ 25 mg once a day.  I also  schedule her to undergo lab work to check her electrolytes, blood count, TSH and A1c.                  Electronically signed by Karely Avina MD March 26, 2019 4:01 PM

## 2019-03-26 NOTE — PATIENT INSTRUCTIONS
Mediterranean Diet  A Mediterranean diet refers to food and lifestyle choices that are based on the traditions of countries located on the [place]. This way of eating has been shown to help prevent certain conditions and improve outcomes for people who have chronic diseases, like kidney disease and heart disease.  What are tips for following this plan?  Lifestyle  · Cook and eat meals together with your family, when possible.  · Drink enough fluid to keep your urine clear or pale yellow.  · Be physically active every day. This includes:  ? Aerobic exercise like running or swimming.  ? Leisure activities like gardening, walking, or housework.  · Get 7-8 hours of sleep each night.  · If recommended by your health care provider, drink red wine in moderation. This means 1 glass a day for nonpregnant women and 2 glasses a day for men. A glass of wine equals 5 oz (150 mL).  Reading food labels  · Check the serving size of packaged foods. For foods such as rice and pasta, the serving size refers to the amount of cooked product, not dry.  · Check the total fat in packaged foods. Avoid foods that have saturated fat or trans fats.  · Check the ingredients list for added sugars, such as corn syrup.  Shopping  · At the grocery store, buy most of your food from the areas near the walls of the store. This includes:  ? Fresh fruits and vegetables (produce).  ? Grains, beans, nuts, and seeds. Some of these may be available in unpackaged forms or large amounts (in bulk).  ? Fresh seafood.  ? Poultry and eggs.  ? Low-fat dairy products.  · Buy whole ingredients instead of prepackaged foods.  · Buy fresh fruits and vegetables in-season from local farmers markets.  · Buy frozen fruits and vegetables in resealable bags.  · If you do not have access to quality fresh seafood, buy precooked frozen shrimp or canned fish, such as tuna, salmon, or sardines.  · Buy small amounts of raw or cooked vegetables, salads, or olives from the  deli or salad bar at your store.  · Stock your pantry so you always have certain foods on hand, such as olive oil, canned tuna, canned tomatoes, rice, pasta, and beans.  Cooking  · Cook foods with extra-virgin olive oil instead of using butter or other vegetable oils.  · Have meat as a side dish, and have vegetables or grains as your main dish. This means having meat in small portions or adding small amounts of meat to foods like pasta or stew.  · Use beans or vegetables instead of meat in common dishes like chili or lasagna.  · [place]ora with different cooking methods. Try roasting or broiling vegetables instead of steaming or sautéeing them.  · Add frozen vegetables to soups, stews, pasta, or rice.  · Add nuts or seeds for added healthy fat at each meal. You can add these to yogurt, salads, or vegetable dishes.  · Marinate fish or vegetables using olive oil, lemon juice, garlic, and fresh herbs.  Meal planning  · Plan to eat 1 vegetarian meal one day each week. Try to work up to 2 vegetarian meals, if possible.  · Eat seafood 2 or more times a week.  · Have healthy snacks readily available, such as:  ? Vegetable sticks with hummus.  ? Greek yogurt.  ? Fruit and nut trail mix.  · Eat balanced meals throughout the week. This includes:  ? Fruit: 2-3 servings a day  ? Vegetables: 4-5 servings a day  ? Low-fat dairy: 2 servings a day  ? Fish, poultry, or lean meat: 1 serving a day  ? Beans and legumes: 2 or more servings a week  ? Nuts and seeds: 1-2 servings a day  ? Whole grains: 6-8 servings a day  ? Extra-virgin olive oil: 3-4 servings a day  · Limit red meat and sweets to only a few servings a month  What are my food choices?  · Mediterranean diet  ? Recommended  ? Grains: Whole-grain pasta. Brown rice. Bulgar wheat. Polenta. Couscous. Whole-wheat bread. Oatmeal. Quinoa.  ? Vegetables: Artichokes. Beets. Broccoli. Cabbage. Carrots. Eggplant. Green beans. Chard. Kale. Spinach. Onions. Leeks. Peas. Squash.  Tomatoes. Peppers. Radishes.  ? Fruits: Apples. Apricots. Avocado. Berries. Bananas. Cherries. Dates. Figs. Grapes. [place]. Melon. Oranges. Peaches. Plums. Pomegranate.  ? Meats and other protein foods: Beans. Almonds. Sunflower seeds. Pine nuts. Peanuts. Cod. [place]. Scallops. Shrimp. Tuna. Tilapia. Clams. Oysters. Eggs.  ? Dairy: Low-fat milk. Cheese. Greek yogurt.  ? Beverages: Water. Red wine. Herbal tea.  ? Fats and oils: Extra virgin olive oil. Avocado oil. Grape seed oil.  ? Sweets and desserts: Greek yogurt with honey. Baked apples. Poached pears. Trail mix.  ? Seasoning and other foods: Basil. Cilantro. Coriander. Cumin. Mint. Parsley. [place]. Rosemary. Tarragon. Garlic. Oregano. Thyme. Pepper. Balsalmic vinegar. Tahini. Hummus. Tomato sauce. Olives. Mushrooms.  ? Limit these  ? Grains: Prepackaged pasta or rice dishes. Prepackaged cereal with added sugar.  ? Vegetables: Deep fried potatoes (french fries).  ? Fruits: Fruit canned in syrup.  ? Meats and other protein foods: Beef. Pork. Lamb. Poultry with skin. Hot dogs. [place].  ? Dairy: Ice cream. Sour cream. Whole milk.  ? Beverages: Juice. Sugar-sweetened soft drinks. Beer. Liquor and spirits.  ? Fats and oils: Butter. Canola oil. Vegetable oil. Beef fat (tallow). Lard.  ? Sweets and desserts: Cookies. Cakes. Pies. Candy.  ? Seasoning and other foods: Mayonnaise. Premade sauces and marinades.  ? The items listed may not be a complete list. Talk with your dietitian about what dietary choices are right for you.  Summary  · The Mediterranean diet includes both food and lifestyle choices.  · Eat a variety of fresh fruits and vegetables, beans, nuts, seeds, and whole grains.  · Limit the amount of red meat and sweets that you eat.  · Talk with your health care provider about whether it is safe for you to drink red wine in moderation. This means 1 glass a day for nonpregnant women and 2 glasses a day for men. A glass of wine equals 5 oz (150 mL).  This information  is not intended to replace advice given to you by your health care provider. Make sure you discuss any questions you have with your health care provider.  Document Released: 08/10/2017 Document Revised: 09/12/2017 Document Reviewed: 08/10/2017  [employer] Patient Education © 2019 [employer]

## 2019-04-09 ENCOUNTER — APPOINTMENT (OUTPATIENT)
Dept: CARDIOLOGY | Facility: HOSPITAL | Age: 64
End: 2019-04-09

## 2019-04-17 ENCOUNTER — HOSPITAL ENCOUNTER (OUTPATIENT)
Dept: CARDIOLOGY | Facility: HOSPITAL | Age: 64
Discharge: HOME | End: 2019-04-17

## 2019-04-17 ENCOUNTER — LAB (OUTPATIENT)
Dept: LAB | Facility: HOSPITAL | Age: 64
End: 2019-04-17

## 2019-04-17 DIAGNOSIS — R00.2 PALPITATIONS: ICD-10-CM

## 2019-04-17 DIAGNOSIS — R07.2 PRECORDIAL PAIN: ICD-10-CM

## 2019-04-17 DIAGNOSIS — I10 ESSENTIAL HYPERTENSION: ICD-10-CM

## 2019-04-17 DIAGNOSIS — R06.02 SHORTNESS OF BREATH: ICD-10-CM

## 2019-04-17 DIAGNOSIS — E03.9 ACQUIRED HYPOTHYROIDISM: ICD-10-CM

## 2019-04-17 DIAGNOSIS — R73.9 HYPERGLYCEMIA: ICD-10-CM

## 2019-04-17 DIAGNOSIS — E78.00 HYPERCHOLESTEREMIA: ICD-10-CM

## 2019-04-17 DIAGNOSIS — E88.81 METABOLIC SYNDROME: ICD-10-CM

## 2019-04-17 DIAGNOSIS — E83.42 HYPOMAGNESEMIA: ICD-10-CM

## 2019-04-17 LAB
ALBUMIN SERPL-MCNC: 3.94 G/DL (ref 3.5–5.2)
ALBUMIN/GLOB SERPL: 1.2 G/DL
ALP SERPL-CCNC: 65 U/L (ref 39–117)
ALT SERPL W P-5'-P-CCNC: 15 U/L (ref 1–33)
ANION GAP SERPL CALCULATED.3IONS-SCNC: 14.1 MMOL/L
AST SERPL-CCNC: 19 U/L (ref 1–32)
BASOPHILS # BLD AUTO: 0.04 10*3/MM3 (ref 0–0.2)
BASOPHILS NFR BLD AUTO: 0.6 % (ref 0–1.5)
BH CV ECHO MEAS - ACS: 2 CM
BH CV ECHO MEAS - AO MEAN PG: 3.3 MMHG
BH CV ECHO MEAS - AO ROOT AREA (BSA CORRECTED): 1.4
BH CV ECHO MEAS - AO ROOT AREA: 6.2 CM^2
BH CV ECHO MEAS - AO ROOT DIAM: 2.8 CM
BH CV ECHO MEAS - AO V2 MEAN: 85.2 CM/SEC
BH CV ECHO MEAS - AO V2 VTI: 27.6 CM
BH CV ECHO MEAS - BSA(HAYCOCK): 2.2 M^2
BH CV ECHO MEAS - BSA: 2.1 M^2
BH CV ECHO MEAS - BZI_BMI: 40.9 KILOGRAMS/M^2
BH CV ECHO MEAS - BZI_METRIC_HEIGHT: 160 CM
BH CV ECHO MEAS - BZI_METRIC_WEIGHT: 104.8 KG
BH CV ECHO MEAS - EDV(CUBED): 58 ML
BH CV ECHO MEAS - EDV(MOD-SP4): 76 ML
BH CV ECHO MEAS - EDV(TEICH): 64.7 ML
BH CV ECHO MEAS - EF(CUBED): 43 %
BH CV ECHO MEAS - EF(MOD-SP4): 57.9 %
BH CV ECHO MEAS - EF(TEICH): 36.3 %
BH CV ECHO MEAS - ESV(CUBED): 33 ML
BH CV ECHO MEAS - ESV(MOD-SP4): 32 ML
BH CV ECHO MEAS - ESV(TEICH): 41.2 ML
BH CV ECHO MEAS - FS: 17.1 %
BH CV ECHO MEAS - IVS/LVPW: 0.97
BH CV ECHO MEAS - IVSD: 1.1 CM
BH CV ECHO MEAS - LA DIMENSION: 3.7 CM
BH CV ECHO MEAS - LA/AO: 1.3
BH CV ECHO MEAS - LV DIASTOLIC VOL/BSA (35-75): 37 ML/M^2
BH CV ECHO MEAS - LV IVRT: 0.1 SEC
BH CV ECHO MEAS - LV MASS(C)D: 150.7 GRAMS
BH CV ECHO MEAS - LV MASS(C)DI: 73.3 GRAMS/M^2
BH CV ECHO MEAS - LV SYSTOLIC VOL/BSA (12-30): 15.6 ML/M^2
BH CV ECHO MEAS - LVIDD: 3.9 CM
BH CV ECHO MEAS - LVIDS: 3.2 CM
BH CV ECHO MEAS - LVLD AP4: 6.8 CM
BH CV ECHO MEAS - LVLS AP4: 6.1 CM
BH CV ECHO MEAS - LVOT AREA (M): 3.1 CM^2
BH CV ECHO MEAS - LVOT AREA: 3 CM^2
BH CV ECHO MEAS - LVOT DIAM: 2 CM
BH CV ECHO MEAS - LVPWD: 1.2 CM
BH CV ECHO MEAS - MV A MAX VEL: 54.3 CM/SEC
BH CV ECHO MEAS - MV DEC SLOPE: 196.6 CM/SEC^2
BH CV ECHO MEAS - MV E MAX VEL: 50.8 CM/SEC
BH CV ECHO MEAS - MV E/A: 0.94
BH CV ECHO MEAS - RAP SYSTOLE: 10 MMHG
BH CV ECHO MEAS - RVDD: 3.1 CM
BH CV ECHO MEAS - RVSP: 37.5 MMHG
BH CV ECHO MEAS - SI(AO): 82.8 ML/M^2
BH CV ECHO MEAS - SI(CUBED): 12.1 ML/M^2
BH CV ECHO MEAS - SI(MOD-SP4): 21.4 ML/M^2
BH CV ECHO MEAS - SI(TEICH): 11.4 ML/M^2
BH CV ECHO MEAS - SV(AO): 170.2 ML
BH CV ECHO MEAS - SV(CUBED): 25 ML
BH CV ECHO MEAS - SV(MOD-SP4): 44 ML
BH CV ECHO MEAS - SV(TEICH): 23.5 ML
BH CV ECHO MEAS - TR MAX VEL: 262 CM/SEC
BH CV STRESS RECOVERY BP: NORMAL MMHG
BH CV STRESS RECOVERY HR: 78 BPM
BILIRUB SERPL-MCNC: 0.4 MG/DL (ref 0.2–1.2)
BUN BLD-MCNC: 16 MG/DL (ref 8–23)
BUN/CREAT SERPL: 15.8 (ref 7–25)
CALCIUM SPEC-SCNC: 9.2 MG/DL (ref 8.6–10.5)
CHLORIDE SERPL-SCNC: 100 MMOL/L (ref 98–107)
CHOLEST SERPL-MCNC: 155 MG/DL (ref 0–200)
CO2 SERPL-SCNC: 23.9 MMOL/L (ref 22–29)
CREAT BLD-MCNC: 1.01 MG/DL (ref 0.57–1)
CRP SERPL-MCNC: 0.53 MG/DL (ref 0.01–0.5)
DEPRECATED RDW RBC AUTO: 48 FL (ref 37–54)
EOSINOPHIL # BLD AUTO: 0.11 10*3/MM3 (ref 0–0.4)
EOSINOPHIL NFR BLD AUTO: 1.6 % (ref 0.3–6.2)
ERYTHROCYTE [DISTWIDTH] IN BLOOD BY AUTOMATED COUNT: 14.7 % (ref 12.3–15.4)
ERYTHROCYTE [SEDIMENTATION RATE] IN BLOOD: 17 MM/HR (ref 0–30)
GFR SERPL CREATININE-BSD FRML MDRD: 55 ML/MIN/1.73
GLOBULIN UR ELPH-MCNC: 3.3 GM/DL
GLUCOSE BLD-MCNC: 109 MG/DL (ref 65–99)
HBA1C MFR BLD: 5.4 % (ref 4.8–5.6)
HCT VFR BLD AUTO: 42.5 % (ref 34–46.6)
HDLC SERPL-MCNC: 48 MG/DL (ref 40–60)
HGB BLD-MCNC: 13.8 G/DL (ref 12–15.9)
IMM GRANULOCYTES # BLD AUTO: 0.01 10*3/MM3 (ref 0–0.05)
IMM GRANULOCYTES NFR BLD AUTO: 0.1 % (ref 0–0.5)
LDLC SERPL CALC-MCNC: 93 MG/DL (ref 0–100)
LDLC/HDLC SERPL: 1.94 {RATIO}
LV EF NUC BP: 68 %
LYMPHOCYTES # BLD AUTO: 1.79 10*3/MM3 (ref 0.7–3.1)
LYMPHOCYTES NFR BLD AUTO: 25.3 % (ref 19.6–45.3)
MAGNESIUM SERPL-MCNC: 1.9 MG/DL (ref 1.6–2.4)
MAXIMAL PREDICTED HEART RATE: 155 BPM
MAXIMAL PREDICTED HEART RATE: 155 BPM
MCH RBC QN AUTO: 30.1 PG (ref 26.6–33)
MCHC RBC AUTO-ENTMCNC: 32.5 G/DL (ref 31.5–35.7)
MCV RBC AUTO: 92.8 FL (ref 79–97)
MONOCYTES # BLD AUTO: 0.65 10*3/MM3 (ref 0.1–0.9)
MONOCYTES NFR BLD AUTO: 9.2 % (ref 5–12)
NEUTROPHILS # BLD AUTO: 4.48 10*3/MM3 (ref 1.4–7)
NEUTROPHILS NFR BLD AUTO: 63.2 % (ref 42.7–76)
PERCENT MAX PREDICTED HR: 77.42 %
PLATELET # BLD AUTO: 328 10*3/MM3 (ref 140–450)
PMV BLD AUTO: 10.7 FL (ref 6–12)
POTASSIUM BLD-SCNC: 4.3 MMOL/L (ref 3.5–5.2)
PROT SERPL-MCNC: 7.2 G/DL (ref 6–8.5)
RBC # BLD AUTO: 4.58 10*6/MM3 (ref 3.77–5.28)
SODIUM BLD-SCNC: 138 MMOL/L (ref 136–145)
STRESS BASELINE BP: NORMAL MMHG
STRESS BASELINE HR: 55 BPM
STRESS PERCENT HR: 91 %
STRESS POST ESTIMATED WORKLOAD: 4.6 METS
STRESS POST EXERCISE DUR MIN: 9 MIN
STRESS POST EXERCISE DUR SEC: 1 SEC
STRESS POST PEAK BP: NORMAL MMHG
STRESS POST PEAK HR: 120 BPM
STRESS TARGET HR: 132 BPM
STRESS TARGET HR: 132 BPM
TRIGL SERPL-MCNC: 70 MG/DL (ref 0–150)
TSH SERPL DL<=0.05 MIU/L-ACNC: 4.19 MIU/ML (ref 0.27–4.2)
VLDLC SERPL-MCNC: 14 MG/DL
WBC NRBC COR # BLD: 7.08 10*3/MM3 (ref 3.4–10.8)

## 2019-04-17 PROCEDURE — 84443 ASSAY THYROID STIM HORMONE: CPT | Performed by: INTERNAL MEDICINE

## 2019-04-17 PROCEDURE — 80061 LIPID PANEL: CPT | Performed by: INTERNAL MEDICINE

## 2019-04-17 PROCEDURE — A9500 TC99M SESTAMIBI: HCPCS | Performed by: INTERNAL MEDICINE

## 2019-04-17 PROCEDURE — 93018 CV STRESS TEST I&R ONLY: CPT | Performed by: INTERNAL MEDICINE

## 2019-04-17 PROCEDURE — 0 TECHNETIUM SESTAMIBI: Performed by: INTERNAL MEDICINE

## 2019-04-17 PROCEDURE — 93306 TTE W/DOPPLER COMPLETE: CPT

## 2019-04-17 PROCEDURE — 78452 HT MUSCLE IMAGE SPECT MULT: CPT

## 2019-04-17 PROCEDURE — 93306 TTE W/DOPPLER COMPLETE: CPT | Performed by: INTERNAL MEDICINE

## 2019-04-17 PROCEDURE — 85025 COMPLETE CBC W/AUTO DIFF WBC: CPT | Performed by: INTERNAL MEDICINE

## 2019-04-17 PROCEDURE — 36415 COLL VENOUS BLD VENIPUNCTURE: CPT

## 2019-04-17 PROCEDURE — 83036 HEMOGLOBIN GLYCOSYLATED A1C: CPT | Performed by: INTERNAL MEDICINE

## 2019-04-17 PROCEDURE — 83735 ASSAY OF MAGNESIUM: CPT | Performed by: INTERNAL MEDICINE

## 2019-04-17 PROCEDURE — 80053 COMPREHEN METABOLIC PANEL: CPT | Performed by: INTERNAL MEDICINE

## 2019-04-17 PROCEDURE — 93017 CV STRESS TEST TRACING ONLY: CPT

## 2019-04-17 PROCEDURE — 78452 HT MUSCLE IMAGE SPECT MULT: CPT | Performed by: INTERNAL MEDICINE

## 2019-04-17 PROCEDURE — 86141 C-REACTIVE PROTEIN HS: CPT | Performed by: INTERNAL MEDICINE

## 2019-04-17 PROCEDURE — 85652 RBC SED RATE AUTOMATED: CPT | Performed by: INTERNAL MEDICINE

## 2019-04-17 RX ADMIN — TECHNETIUM TC 99M SESTAMIBI 1 DOSE: 1 INJECTION INTRAVENOUS at 10:32

## 2019-04-17 RX ADMIN — TECHNETIUM TC 99M SESTAMIBI 1 DOSE: 1 INJECTION INTRAVENOUS at 10:25

## 2019-06-18 DIAGNOSIS — I10 ESSENTIAL HYPERTENSION: ICD-10-CM

## 2019-06-18 RX ORDER — LISINOPRIL 5 MG/1
TABLET ORAL
Qty: 90 TABLET | Refills: 2 | OUTPATIENT
Start: 2019-06-18

## 2019-08-04 DIAGNOSIS — I48.0 PAF (PAROXYSMAL ATRIAL FIBRILLATION) (HCC): ICD-10-CM

## 2019-08-05 RX ORDER — APIXABAN 5 MG/1
TABLET, FILM COATED ORAL
Qty: 60 TABLET | Refills: 3 | Status: SHIPPED | OUTPATIENT
Start: 2019-08-05 | End: 2019-10-14 | Stop reason: SDUPTHER

## 2019-09-29 DIAGNOSIS — R12 HEART BURN: ICD-10-CM

## 2019-09-29 DIAGNOSIS — I10 ESSENTIAL HYPERTENSION: ICD-10-CM

## 2019-09-29 DIAGNOSIS — I48.0 PAF (PAROXYSMAL ATRIAL FIBRILLATION) (HCC): ICD-10-CM

## 2019-09-30 RX ORDER — RANITIDINE 300 MG/1
TABLET ORAL
Qty: 90 TABLET | Refills: 4 | OUTPATIENT
Start: 2019-09-30

## 2019-09-30 RX ORDER — DILTIAZEM HYDROCHLORIDE 120 MG/1
CAPSULE, COATED, EXTENDED RELEASE ORAL
Qty: 90 CAPSULE | Refills: 0 | Status: SHIPPED | OUTPATIENT
Start: 2019-09-30 | End: 2019-10-14 | Stop reason: SDUPTHER

## 2019-10-14 ENCOUNTER — OFFICE VISIT (OUTPATIENT)
Dept: CARDIOLOGY | Facility: CLINIC | Age: 64
End: 2019-10-14

## 2019-10-14 VITALS
SYSTOLIC BLOOD PRESSURE: 128 MMHG | WEIGHT: 231 LBS | BODY MASS INDEX: 40.93 KG/M2 | HEIGHT: 63 IN | DIASTOLIC BLOOD PRESSURE: 84 MMHG | HEART RATE: 60 BPM

## 2019-10-14 DIAGNOSIS — I10 ESSENTIAL HYPERTENSION: ICD-10-CM

## 2019-10-14 DIAGNOSIS — R01.1 MURMUR, CARDIAC: ICD-10-CM

## 2019-10-14 DIAGNOSIS — R06.02 SHORTNESS OF BREATH: ICD-10-CM

## 2019-10-14 DIAGNOSIS — E88.81 METABOLIC SYNDROME: ICD-10-CM

## 2019-10-14 DIAGNOSIS — R73.9 HYPERGLYCEMIA: ICD-10-CM

## 2019-10-14 DIAGNOSIS — E03.9 ACQUIRED HYPOTHYROIDISM: ICD-10-CM

## 2019-10-14 DIAGNOSIS — I48.0 PAF (PAROXYSMAL ATRIAL FIBRILLATION) (HCC): Primary | ICD-10-CM

## 2019-10-14 PROCEDURE — 93000 ELECTROCARDIOGRAM COMPLETE: CPT | Performed by: INTERNAL MEDICINE

## 2019-10-14 PROCEDURE — 99213 OFFICE O/P EST LOW 20 MIN: CPT | Performed by: INTERNAL MEDICINE

## 2019-10-14 RX ORDER — FLECAINIDE ACETATE 50 MG/1
50 TABLET ORAL 2 TIMES DAILY
Qty: 180 TABLET | Refills: 3 | Status: SHIPPED | OUTPATIENT
Start: 2019-10-14 | End: 2020-08-18 | Stop reason: SDUPTHER

## 2019-10-14 RX ORDER — LISINOPRIL 10 MG/1
5 TABLET ORAL DAILY
Qty: 90 TABLET | Refills: 3 | Status: SHIPPED | OUTPATIENT
Start: 2019-10-14 | End: 2020-08-18

## 2019-10-14 RX ORDER — DILTIAZEM HYDROCHLORIDE 120 MG/1
120 CAPSULE, COATED, EXTENDED RELEASE ORAL DAILY
Qty: 90 CAPSULE | Refills: 3 | Status: SHIPPED | OUTPATIENT
Start: 2019-10-14 | End: 2020-08-18 | Stop reason: SDUPTHER

## 2019-10-14 NOTE — PROGRESS NOTES
Chief Complaint   Patient presents with   • Follow-up     cardiac management.  After last visit, HCTZ added and lisinopril increased.  PCP stopped HCTZ and lowered lisinopril back to 5 mg due to hypotension, weakness.  Pt feeling better.   • Med Refill     verbally confirmed meds.  Patient needs refill on cardiac meds. Eliquis 30 days, other scripts 90 days supply.  [facility]   • Labs     April 2019 labs in chart.  Patient also had labs with PCP this month.       CARDIAC COMPLAINTS  dyspnea and fatigue        Subjective   Dunia Eduardo is a 63 y.o. female came in today for her follow-up visit.  She has history of PAF for which she has undergone cardioversion in the past.  She also has undergone atrial flutter ablation last year.  During her last visit her blood pressure was elevated and she was having a lot of chest pain and shortness of breath.  The dose of ACE inhibitors was increased as well as low-dose of hydrochlorothiazide was given.  Few days after taking the medication she started getting little hypotensive.  The HCTZ was stopped and the ACE inhibitors was reduced again.  She also underwent a stress test and echocardiogram which showed normal LV function, no ischemia.  She also underwent lab work at that time which showed her blood sugar was 109 with GFR of 55.  Her lipid panel showed LDL of 93 and her thyroid function test was normal at 4.19.  She apparently had a checked again but I do not have the results.  She denies having any more chest pain .  She still has some mild fatigue and shortness of breath.  She has not lost any weight at all.              Cardiac History  Past Surgical History:   Procedure Laterality Date   • CARDIAC ELECTROPHYSIOLOGY PROCEDURE N/A 11/15/2018    Ablation atrial flutter;  Surgeon: Phani Kidd MD;   • CARDIOVASCULAR STRESS TEST  09/24/2012    5 Min, 85% THR. BP-178/90. Pt had CP. Negative    • CARDIOVASCULAR STRESS TEST  08/01/2018    (Mod) 9 Min. 4.6 METS. 76% THR.  Quality 111:Pneumonia Vaccination Status For Older Adults: Pneumococcal Vaccination Previously Received BP- 197/97. Negative.   • CARDIOVASCULAR STRESS TEST  04/17/2019    (Mod) 9 Min. 4.6 METS. 77% THR. BP- 156/80. EF 68%. Negative.   • CARDIOVERSION  01/23/2015    Converted    • CARDIOVERSION  10/10/2018    Converted to Sinus   • CHOLECYSTECTOMY     • ECHO - CONVERTED  08/16/2012    EF 65%. RVSP-41mmHg. R/O PFO   • ECHO - CONVERTED  09/24/2012    small PFO   • ECHO - CONVERTED  01/16/2015    EF 65%   • ECHO - CONVERTED  08/01/2018    EF 65%. RVSP- 40 mmHg.   • ECHO - CONVERTED  04/17/2019    EF 65%. Mild MR. Mod TR. RVSP- 38 mmHg.   • HYSTERECTOMY     • OTHER SURGICAL HISTORY  12/2011    cerebral angiogram, S[facility] Wolfe   • OTHER SURGICAL HISTORY  2012    Cardionet-PAF   • OTHER SURGICAL HISTORY  11/02/2012    small PFO       Current Outpatient Medications   Medication Sig Dispense Refill   • apixaban (ELIQUIS) 5 MG tablet tablet Take 1 tablet by mouth 2 (Two) Times a Day. 60 tablet 8   • dilTIAZem CD (CARDIZEM CD) 120 MG 24 hr capsule Take 1 capsule by mouth Daily. 90 capsule 3   • flecainide (TAMBOCOR) 50 MG tablet Take 1 tablet by mouth 2 (Two) Times a Day. 180 tablet 3   • levothyroxine (SYNTHROID, LEVOTHROID) 50 MCG tablet Take 50 mcg by mouth Every Morning.     • lisinopril (PRINIVIL,ZESTRIL) 10 MG tablet Take 0.5 tablets by mouth Daily. 90 tablet 3   • raNITIdine (ZANTAC) 300 MG tablet Take 1 tablet by mouth Every Night. 90 tablet 4   • sertraline (ZOLOFT) 25 MG tablet Take 25 mg by mouth Every Morning.       No current facility-administered medications for this visit.        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   • Atrial flutter (CMS/HCC)    • Gallbladder removed    • GERD (gastroesophageal reflux disease)    • Heart murmur    • Hx of cholecystectomy    • Hx of  Detail Level: Detailed hysterectomy    • Hypercalcemia    • Hyperlipidemia    • Hypertension    • Hypothyroidism    • PAF (paroxysmal atrial fibrillation) (CMS/HCC)    • PONV (postoperative nausea and vomiting)    • Wears glasses    • Wears partial dentures        Social History     Socioeconomic History   • Marital status:      Spouse name: Not on file   • Number of children: Not on file   • Years of education: Not on file   • Highest education level: Not on file   Tobacco Use   • Smoking status: Former Smoker     Types: Cigarettes     Last attempt to quit: 2008     Years since quittin.0   • Smokeless tobacco: Never Used   Substance and Sexual Activity   • Alcohol use: No   • Drug use: No   • Sexual activity: Defer       Family History   Problem Relation Age of Onset   • Heart failure Mother    • Heart failure Father    • Hypertension Brother    • Heart attack Other    • Heart disease Other    • Heart failure Other    • Diabetes Other    • Hypertension Other    • Clotting disorder Other        Review of Systems   Constitution: Positive for malaise/fatigue. Negative for decreased appetite.   HENT: Negative for congestion and sore throat.    Eyes: Negative for blurred vision.   Cardiovascular: Positive for dyspnea on exertion. Negative for chest pain.   Respiratory: Positive for shortness of breath. Negative for snoring.    Endocrine: Negative for cold intolerance and heat intolerance.   Hematologic/Lymphatic: Negative for adenopathy. Does not bruise/bleed easily.   Skin: Negative for itching, nail changes and skin cancer.   Musculoskeletal: Negative for arthritis and myalgias.   Gastrointestinal: Negative for abdominal pain, dysphagia and heartburn.   Genitourinary: Negative for bladder incontinence and frequency.   Neurological: Negative for dizziness, light-headedness, seizures and vertigo.   Psychiatric/Behavioral: Negative for altered mental status.   Allergic/Immunologic: Negative for environmental allergies and  "hives.       Diabetes- No  Thyroid- abnormal    Objective     /84 (BP Location: Right arm, Cuff Size: Large Adult)   Pulse 60   Ht 160 cm (63\")   Wt 105 kg (231 lb)   BMI 40.92 kg/m²     Physical Exam   Constitutional: She is oriented to person, place, and time. She appears well-developed and well-nourished.   HENT:   Head: Normocephalic.   Nose: Nose normal.   Eyes: EOM are normal. Pupils are equal, round, and reactive to light.   Neck: Normal range of motion. Neck supple.   Cardiovascular: Normal rate, regular rhythm, S1 normal and S2 normal.   Murmur heard.  Pulmonary/Chest: Effort normal and breath sounds normal.   Abdominal: Soft. Bowel sounds are normal.   Musculoskeletal: Normal range of motion. She exhibits no edema.   Neurological: She is alert and oriented to person, place, and time.   Skin: Skin is warm and dry.   Psychiatric: She has a normal mood and affect.       ECG 12 Lead  Date/Time: 10/14/2019 2:41 PM  Performed by: Karely Avina MD  Authorized by: Karely Avina MD   Comparison: compared with previous ECG from 3/26/2019  Similar to previous ECG  Rhythm: sinus rhythm  Rate: normal  Conduction: incomplete right bundle branch block  QRS axis: normal  Other findings: left ventricular hypertrophy    Clinical impression: non-specific ECG                    Assessment/Plan   Patient's Body mass index is 40.92 kg/m². BMI is above normal parameters. Recommendations include: exercise counseling and nutrition counseling.     Dunia was seen today for follow-up, med refill and labs.    Diagnoses and all orders for this visit:    PAF (paroxysmal atrial fibrillation) (CMS/[facility])  -     dilTIAZem CD (CARDIZEM CD) 120 MG 24 hr capsule; Take 1 capsule by mouth Daily.  -     apixaban (ELIQUIS) 5 MG tablet tablet; Take 1 tablet by mouth 2 (Two) Times a Day.  -     flecainide (TAMBOCOR) 50 MG tablet; Take 1 tablet by mouth 2 (Two) Times a Day.    Essential hypertension  -     dilTIAZem CD " (CARDIZEM CD) 120 MG 24 hr capsule; Take 1 capsule by mouth Daily.  -     lisinopril (PRINIVIL,ZESTRIL) 10 MG tablet; Take 0.5 tablets by mouth Daily.    Shortness of breath    Metabolic syndrome    Hyperglycemia    Murmur, cardiac    Acquired hypothyroidism    At baseline at heart rate and blood pressure appears stable.  Her EKG showed normal sinus rhythm with nonspecific changes same as in March.  Her clinical examination reveals her BMI is still around 41.  I had a very long talk with her about the metabolic syndrome.  I talked to her again about the low-carb diet.      Regarding her PAF we will continue the Cardizem and Tambocor along with the Eliquis.  Her EKG showed normal QTC.    Regarding her blood pressure it seems to be controlled with low-dose of ACE inhibitors along with the Cardizem CD.  At this time continue the same.    Regarding her shortness of breath is most probably from the metabolic syndrome.  She is advised again about low carb diet and exercise program.    She is advised to talk to you regarding her borderline elevated sugar as well as regarding the hypothyroidism.  Overall cardiac status appears stable, I will see her back in 6 months or sooner if needed.                    Electronically signed by Karely Avina MD October 14, 2019 2:36 PM

## 2020-06-17 DIAGNOSIS — I48.0 PAF (PAROXYSMAL ATRIAL FIBRILLATION) (HCC): ICD-10-CM

## 2020-06-17 RX ORDER — APIXABAN 5 MG/1
TABLET, FILM COATED ORAL
Qty: 60 TABLET | Refills: 11 | Status: SHIPPED | OUTPATIENT
Start: 2020-06-17 | End: 2020-08-18 | Stop reason: SDUPTHER

## 2020-08-18 ENCOUNTER — OFFICE VISIT (OUTPATIENT)
Dept: CARDIOLOGY | Facility: CLINIC | Age: 65
End: 2020-08-18

## 2020-08-18 VITALS
WEIGHT: 231 LBS | BODY MASS INDEX: 40.93 KG/M2 | HEART RATE: 64 BPM | HEIGHT: 63 IN | SYSTOLIC BLOOD PRESSURE: 140 MMHG | DIASTOLIC BLOOD PRESSURE: 100 MMHG | TEMPERATURE: 98.3 F

## 2020-08-18 DIAGNOSIS — I48.0 PAF (PAROXYSMAL ATRIAL FIBRILLATION) (HCC): Primary | ICD-10-CM

## 2020-08-18 DIAGNOSIS — R00.2 PALPITATIONS: ICD-10-CM

## 2020-08-18 DIAGNOSIS — I10 ESSENTIAL HYPERTENSION: ICD-10-CM

## 2020-08-18 DIAGNOSIS — E66.01 MORBIDLY OBESE (HCC): ICD-10-CM

## 2020-08-18 DIAGNOSIS — E78.00 HYPERCHOLESTEREMIA: ICD-10-CM

## 2020-08-18 DIAGNOSIS — E03.9 ACQUIRED HYPOTHYROIDISM: ICD-10-CM

## 2020-08-18 DIAGNOSIS — E83.42 HYPOMAGNESEMIA: ICD-10-CM

## 2020-08-18 DIAGNOSIS — R06.02 SHORTNESS OF BREATH: ICD-10-CM

## 2020-08-18 DIAGNOSIS — I48.3 TYPICAL ATRIAL FLUTTER (HCC): ICD-10-CM

## 2020-08-18 PROCEDURE — 99213 OFFICE O/P EST LOW 20 MIN: CPT | Performed by: INTERNAL MEDICINE

## 2020-08-18 PROCEDURE — 93000 ELECTROCARDIOGRAM COMPLETE: CPT | Performed by: INTERNAL MEDICINE

## 2020-08-18 RX ORDER — LISINOPRIL 10 MG/1
10 TABLET ORAL DAILY
Qty: 90 TABLET | Refills: 3 | Status: SHIPPED | OUTPATIENT
Start: 2020-08-18 | End: 2021-10-11

## 2020-08-18 RX ORDER — DILTIAZEM HYDROCHLORIDE 120 MG/1
120 CAPSULE, COATED, EXTENDED RELEASE ORAL DAILY
Qty: 90 CAPSULE | Refills: 3 | Status: SHIPPED | OUTPATIENT
Start: 2020-08-18 | End: 2021-08-03

## 2020-08-18 RX ORDER — MAGNESIUM OXIDE 400 MG/1
400 TABLET ORAL DAILY
Qty: 90 TABLET | Refills: 3 | Status: SHIPPED | OUTPATIENT
Start: 2020-08-18 | End: 2022-12-06

## 2020-08-18 RX ORDER — FLECAINIDE ACETATE 50 MG/1
50 TABLET ORAL 2 TIMES DAILY
Qty: 180 TABLET | Refills: 3 | Status: SHIPPED | OUTPATIENT
Start: 2020-08-18 | End: 2022-06-27

## 2020-08-18 NOTE — PROGRESS NOTES
Chief Complaint   Patient presents with   • Follow-up     for cardiac management   • Med Refill     refills needed on cardiac meds, 90 days to [facility] in [place]   • Palpitations     has woke with palpitations a couple times, heart racing, episode last week while driving to work.    • Dizziness     randomly occurs, while standing for a while, near syncopal episodes noted. PCP checked carotid US at his office, reported as normal.    • Labs     PCP checked within the past month, was told everything was normal.        CARDIAC COMPLAINTS  dyspnea, fatigue and palpitations        Subjective   Dunia Eduardo is a 64 y.o. female came in today for her follow-up visit.  She has history of hypertension, paroxysmal atrial fibrillation for which she has undergone cardioversion in the past.  She also has history of atrial flutter for which she has undergone ablation also.  She came today stating that she has been noticing some palpitation recently.  She had 8 couple of times lasting for few seconds.  The last episode happened while she was driving to work.  It lasted for few seconds to few minutes and then subsided.  She also has some episodes of dizziness which occurs randomly if she is standing for a while and she had a near syncopal episode once for which apparently she had a carotid ultrasound done at your office.  She also claimed that she had some labs done.  I do not have the results of the labs or the carotid ultrasound with me.  The last lab results I have is more than a year ago.  She has some question about why this is happening again.              Cardiac History  Past Surgical History:   Procedure Laterality Date   • CARDIAC ELECTROPHYSIOLOGY PROCEDURE N/A 11/15/2018    Ablation atrial flutter;  Surgeon: Phani Kidd MD;   • CARDIOVASCULAR STRESS TEST  09/24/2012    5 Min, 85% THR. BP-178/90. Pt had CP. Negative    • CARDIOVASCULAR STRESS TEST  08/01/2018    (Mod) 9 Min. 4.6 METS. 76% THR. BP- 197/97. Negative.    • CARDIOVASCULAR STRESS TEST  04/17/2019    (Mod) 9 Min. 4.6 METS. 77% THR. BP- 156/80. EF 68%. Negative.   • CARDIOVERSION  01/23/2015    Converted    • CARDIOVERSION  10/10/2018    Converted to Sinus   • CHOLECYSTECTOMY     • ECHO - CONVERTED  08/16/2012    EF 65%. RVSP-41mmHg. R/O PFO   • ECHO - CONVERTED  09/24/2012    small PFO   • ECHO - CONVERTED  01/16/2015    EF 65%   • ECHO - CONVERTED  08/01/2018    EF 65%. RVSP- 40 mmHg.   • ECHO - CONVERTED  04/17/2019    EF 65%. Mild MR. Mod TR. RVSP- 38 mmHg.   • HYSTERECTOMY     • OTHER SURGICAL HISTORY  12/2011    cerebral angiogram, [facility] Wolfe   • OTHER SURGICAL HISTORY  2012    Cardionet-PAF   • OTHER SURGICAL HISTORY  11/02/2012    small PFO       Current Outpatient Medications   Medication Sig Dispense Refill   • apixaban (Eliquis) 5 MG tablet tablet Take 1 tablet by mouth 2 (Two) Times a Day. 180 tablet 3   • dilTIAZem CD (CARDIZEM CD) 120 MG 24 hr capsule Take 1 capsule by mouth Daily. 90 capsule 3   • flecainide (TAMBOCOR) 50 MG tablet Take 1 tablet by mouth 2 (Two) Times a Day. 180 tablet 3   • levothyroxine (SYNTHROID, LEVOTHROID) 50 MCG tablet Take 50 mcg by mouth Every Morning.     • sertraline (ZOLOFT) 25 MG tablet Take 25 mg by mouth Every Morning.     • lisinopril (PRINIVIL,ZESTRIL) 10 MG tablet Take 1 tablet by mouth Daily. 90 tablet 3   • magnesium oxide (MAG-OX) 400 MG tablet Take 1 tablet by mouth Daily. 90 tablet 3     No current facility-administered medications for this visit.        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   • Atrial flutter (CMS/HCC)    • Gallbladder removed    • GERD (gastroesophageal reflux disease)    • Heart murmur    • Hx of cholecystectomy    • Hx of hysterectomy    •  Hypercalcemia    • Hyperlipidemia    • Hypertension    • Hypothyroidism    • PAF (paroxysmal atrial fibrillation) (CMS/HCC)    • PONV (postoperative nausea and vomiting)    • Wears glasses    • Wears partial dentures        Social History     Socioeconomic History   • Marital status:      Spouse name: Not on file   • Number of children: Not on file   • Years of education: Not on file   • Highest education level: Not on file   Tobacco Use   • Smoking status: Former Smoker     Types: Cigarettes     Last attempt to quit: 2008     Years since quittin.9   • Smokeless tobacco: Never Used   Substance and Sexual Activity   • Alcohol use: No   • Drug use: No   • Sexual activity: Defer       Family History   Problem Relation Age of Onset   • Heart failure Mother    • Heart failure Father    • Hypertension Brother    • Heart attack Other    • Heart disease Other    • Heart failure Other    • Diabetes Other    • Hypertension Other    • Clotting disorder Other        Review of Systems   Constitution: Positive for malaise/fatigue. Negative for decreased appetite.   HENT: Negative for congestion and sore throat.    Eyes: Negative for blurred vision.   Cardiovascular: Positive for dyspnea on exertion, near-syncope and palpitations. Negative for chest pain.   Respiratory: Positive for shortness of breath. Negative for snoring.    Endocrine: Negative for cold intolerance and heat intolerance.   Hematologic/Lymphatic: Negative for adenopathy. Does not bruise/bleed easily.   Skin: Negative for itching, nail changes and skin cancer.   Musculoskeletal: Negative for arthritis and myalgias.   Gastrointestinal: Negative for abdominal pain, dysphagia and heartburn.   Genitourinary: Negative for bladder incontinence and frequency.   Neurological: Positive for dizziness. Negative for light-headedness, seizures and vertigo.   Psychiatric/Behavioral: Negative for altered mental status.   Allergic/Immunologic: Negative for  "environmental allergies and hives.       Diabetes- No  Thyroid- abnormal    Objective     /100   Pulse 64   Temp 98.3 °F (36.8 °C)   Ht 160 cm (63\")   Wt 105 kg (231 lb)   BMI 40.92 kg/m²     Physical Exam   Constitutional: She is oriented to person, place, and time. She appears well-developed and well-nourished.   HENT:   Head: Normocephalic.   Nose: Nose normal.   Eyes: Pupils are equal, round, and reactive to light. EOM are normal.   Neck: Normal range of motion. Neck supple.   Cardiovascular: Normal rate, regular rhythm, S1 normal and S2 normal.   Murmur heard.  Pulmonary/Chest: Effort normal and breath sounds normal.   Abdominal: Soft. Bowel sounds are normal.   Musculoskeletal: Normal range of motion. She exhibits no edema.   Neurological: She is alert and oriented to person, place, and time.   Skin: Skin is warm and dry.   Psychiatric: She has a normal mood and affect.       ECG 12 Lead  Date/Time: 8/18/2020 5:22 PM  Performed by: Karely Avina MD  Authorized by: Karely Avina MD   Comparison: compared with previous ECG from 10/14/2019  Similar to previous ECG  Rhythm: sinus rhythm  Rate: normal  Conduction: incomplete right bundle branch block  QRS axis: normal  Other findings: left ventricular hypertrophy    Clinical impression: non-specific ECG                    Assessment/Plan   Patient's Body mass index is 40.92 kg/m². BMI is above normal parameters. Recommendations include: nutrition counseling.     Dunia was seen today for follow-up, med refill, palpitations, dizziness and labs.    Diagnoses and all orders for this visit:    PAF (paroxysmal atrial fibrillation) (CMS/[facility])  -     dilTIAZem CD (CARDIZEM CD) 120 MG 24 hr capsule; Take 1 capsule by mouth Daily.  -     apixaban (Eliquis) 5 MG tablet tablet; Take 1 tablet by mouth 2 (Two) Times a Day.  -     flecainide (TAMBOCOR) 50 MG tablet; Take 1 tablet by mouth 2 (Two) Times a Day.    Essential hypertension  -     lisinopril " (PRINIVIL,ZESTRIL) 10 MG tablet; Take 1 tablet by mouth Daily.  -     dilTIAZem CD (CARDIZEM CD) 120 MG 24 hr capsule; Take 1 capsule by mouth Daily.    Hypercholesteremia    Typical atrial flutter (CMS/HCC)    Palpitations    Shortness of breath    Acquired hypothyroidism    Morbidly obese (CMS/HCC)    Hypomagnesemia  -     magnesium oxide (MAG-OX) 400 MG tablet; Take 1 tablet by mouth Daily.       At baseline her heart rate is stable.  Her blood pressure is slightly elevated.  Her BMI is still around 41.  Last cardiac work-up which was done a year ago showed normal LV function no ischemia no arrhythmia.  I did explain all this findings to her in detail.    Regarding the PAF as well as atrial flutter at this time continue the flecainide Cardizem CD and Eliquis.  I explained to her that this could be either recurrence of the flutter or fib.  At this time do not have any definitive diagnosis.  If she continues to have the palpitation then we can put an extended monitor on her.  Meanwhile I advised her that some of the problems could be just electrolyte imbalance or it could be thyroid abnormality.  She is advised to talk to you regarding that.  I will really appreciate, if he can send me copy of her labs.  At this time I did add Mag-Ox 400 mg once a day.    Regarding her hypertension, it appears to be slightly elevated.  I continued her lisinopril and Cardizem CD and I talked to her about cutting down on the salt intake.    Regarding the shortness of breath, it could be secondary to her obesity itself.  I talked to her again about the diet especially low-carb diet.    She is advised to call our office if the symptoms persist, then will put her on an extended monitor.    Reassurance was given.  I will see her back in 6 months or sooner if needed.                  Electronically signed by Karely Avina MD August 18, 2020 17:17

## 2020-10-16 DIAGNOSIS — I10 ESSENTIAL HYPERTENSION: ICD-10-CM

## 2020-10-16 RX ORDER — LISINOPRIL 10 MG/1
TABLET ORAL
Qty: 45 TABLET | Refills: 7 | OUTPATIENT
Start: 2020-10-16

## 2021-06-24 ENCOUNTER — TELEPHONE (OUTPATIENT)
Dept: CARDIOLOGY | Facility: CLINIC | Age: 66
End: 2021-06-24

## 2021-06-24 NOTE — TELEPHONE ENCOUNTER
Received fax from Dr. Espinoza for cardiac clearance for patient to have a colonoscopy and an EGD. Patient is on Eliquis and they are requesting to hold. According to our records, I do not see where patient has had any stenting. David has a history of PAF.       Fax [phone]

## 2021-08-03 DIAGNOSIS — I10 ESSENTIAL HYPERTENSION: ICD-10-CM

## 2021-08-03 DIAGNOSIS — I48.0 PAF (PAROXYSMAL ATRIAL FIBRILLATION) (HCC): ICD-10-CM

## 2021-08-03 RX ORDER — DILTIAZEM HYDROCHLORIDE 120 MG/1
CAPSULE, COATED, EXTENDED RELEASE ORAL
Qty: 90 CAPSULE | Refills: 3 | Status: SHIPPED | OUTPATIENT
Start: 2021-08-03 | End: 2022-07-08 | Stop reason: DRUGHIGH

## 2021-10-05 DIAGNOSIS — I10 ESSENTIAL HYPERTENSION: ICD-10-CM

## 2021-10-11 RX ORDER — LISINOPRIL 10 MG/1
TABLET ORAL
Qty: 90 TABLET | Refills: 3 | Status: SHIPPED | OUTPATIENT
Start: 2021-10-11 | End: 2022-12-19

## 2022-05-31 ENCOUNTER — OFFICE VISIT (OUTPATIENT)
Dept: CARDIOLOGY | Facility: CLINIC | Age: 67
End: 2022-05-31

## 2022-05-31 VITALS
BODY MASS INDEX: 40.96 KG/M2 | SYSTOLIC BLOOD PRESSURE: 160 MMHG | HEART RATE: 64 BPM | DIASTOLIC BLOOD PRESSURE: 98 MMHG | HEIGHT: 63 IN | WEIGHT: 231.2 LBS

## 2022-05-31 DIAGNOSIS — E16.2 HYPOGLYCEMIA: ICD-10-CM

## 2022-05-31 DIAGNOSIS — R55 NEAR SYNCOPE: Primary | ICD-10-CM

## 2022-05-31 DIAGNOSIS — E78.00 HYPERCHOLESTEREMIA: ICD-10-CM

## 2022-05-31 DIAGNOSIS — I48.0 PAF (PAROXYSMAL ATRIAL FIBRILLATION): ICD-10-CM

## 2022-05-31 DIAGNOSIS — I45.10 INCOMPLETE RBBB: ICD-10-CM

## 2022-05-31 DIAGNOSIS — R06.02 SHORTNESS OF BREATH: ICD-10-CM

## 2022-05-31 DIAGNOSIS — R00.2 PALPITATIONS: ICD-10-CM

## 2022-05-31 DIAGNOSIS — Z79.899 MEDICATION MANAGEMENT: ICD-10-CM

## 2022-05-31 DIAGNOSIS — I10 ESSENTIAL HYPERTENSION: ICD-10-CM

## 2022-05-31 DIAGNOSIS — R01.1 MURMUR, CARDIAC: ICD-10-CM

## 2022-05-31 DIAGNOSIS — R06.09 DOE (DYSPNEA ON EXERTION): ICD-10-CM

## 2022-05-31 DIAGNOSIS — E03.9 ACQUIRED HYPOTHYROIDISM: ICD-10-CM

## 2022-05-31 PROCEDURE — 99214 OFFICE O/P EST MOD 30 MIN: CPT | Performed by: NURSE PRACTITIONER

## 2022-05-31 PROCEDURE — 93000 ELECTROCARDIOGRAM COMPLETE: CPT | Performed by: NURSE PRACTITIONER

## 2022-05-31 NOTE — PROGRESS NOTES
Chief Complaint   Patient presents with   • Follow-up     Cardiac management   • Atrial Fibrillation     Had 1 episode of AFIB May 9,2022.   • LABS     No current labs   • Shortness of Breath     Has random episodes of dizziness and SOA if stands too long. She thinks could possibly be  orthostatic hypotension .She reports SOA has gotten worse in the last couple of months   • Med Refill     Patient asked to let pharmacy contact us when prescriptions are  needed       Subjective       Dunia Eduardo is a 66 y.o. female hypertension, paroxysmal atrial fibrillation for which she has undergone cardioversion in the past.  She also has history of atrial flutter for which she has undergone ablation. Stress test done 4/17/2019 using modified Ian protocol showed blunted heart rate response but normal blood pressure.  Myocardial perfusion study reported as normal. Echocardiogram showed EF of 61 to 65%, mild MR, and moderate TR with RVSP of 38 mmHg.  She was last seen in our office in August 2020.    Today she returns the office for a follow-up visit. Over the last year she has had episodes of feeling lightheaded, almost passed out. As long as she sits down the symptoms will go away. This morning she experienced the lightheadedness while in the shower then while dressing. She has only had a couple of episodes of fluttering. One morning awake with fast heart rate and watch showed atrial fib, laid down and palpitations resolved. Shortnessss of breath has increased, including doing light house work.   This past year she had issues with diarrhea and underwent numerous testing, found ovarian cyst which is being monitored. Diarrhea subsided after stopping heart burn pill.   In November last year she had ultrasound of neck and found nodule on thyroid. She seen surgeon in [facility] who did parathyroid surgery and reports biopsy done and results negative. According to patient carotid arteries showed good blood flow.     Cardiac  History:    Past Surgical History:   Procedure Laterality Date   • CARDIAC ELECTROPHYSIOLOGY PROCEDURE N/A 11/15/2018    Ablation atrial flutter;  Surgeon: Phani Kidd MD;   • CARDIOVASCULAR STRESS TEST  09/24/2012    5 Min, 85% THR. BP-178/90. Pt had CP. Negative    • CARDIOVASCULAR STRESS TEST  08/01/2018    (Mod) 9 Min. 4.6 METS. 76% THR. BP- 197/97. Negative.   • CARDIOVASCULAR STRESS TEST  04/17/2019    (Mod) 9 Min. 4.6 METS. 77% THR. BP- 156/80. EF 68%. Negative.   • CARDIOVERSION  01/23/2015    Converted    • CARDIOVERSION  10/10/2018    Converted to Sinus   • CHOLECYSTECTOMY     • ECHO - CONVERTED  08/16/2012    EF 65%. RVSP-41mmHg. R/O PFO   • ECHO - CONVERTED  09/24/2012    small PFO   • ECHO - CONVERTED  01/16/2015    EF 65%   • ECHO - CONVERTED  08/01/2018    EF 65%. RVSP- 40 mmHg.   • ECHO - CONVERTED  04/17/2019    EF 65%. Mild MR. Mod TR. RVSP- 38 mmHg.   • HYSTERECTOMY     • OTHER SURGICAL HISTORY  12/2011    cerebral angiogram, [place]   • OTHER SURGICAL HISTORY  2012    Cardionet-PAF   • OTHER SURGICAL HISTORY  11/02/2012    small PFO       Current Outpatient Medications   Medication Sig Dispense Refill   • apixaban (Eliquis) 5 MG tablet tablet Take 1 tablet by mouth 2 (Two) Times a Day. 180 tablet 3   • dilTIAZem CD (CARDIZEM CD) 120 MG 24 hr capsule TAKE 1 CAPSULE BY MOUTH EVERY DAY 90 capsule 3   • flecainide (TAMBOCOR) 50 MG tablet Take 1 tablet by mouth 2 (Two) Times a Day. 180 tablet 3   • levothyroxine (SYNTHROID, LEVOTHROID) 50 MCG tablet Take 50 mcg by mouth Every Morning.     • lisinopril (PRINIVIL,ZESTRIL) 10 MG tablet TAKE 1 TABLET BY MOUTH EVERY DAY 90 tablet 3   • magnesium oxide (MAG-OX) 400 MG tablet Take 1 tablet by mouth Daily. 90 tablet 3   • sertraline (ZOLOFT) 25 MG tablet Take 25 mg by mouth Every Morning.       No current facility-administered medications for this visit.       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   • Atrial flutter (HCC)    • Gallbladder removed    • GERD (gastroesophageal reflux disease)    • Heart murmur    • Hx of cholecystectomy    • Hx of hysterectomy    • Hypercalcemia    • Hyperlipidemia    • Hypertension    • Hypothyroidism    • PAF (paroxysmal atrial fibrillation) (HCC)    • PONV (postoperative nausea and vomiting)    • Wears glasses    • Wears partial dentures        Social History     Socioeconomic History   • Marital status:    Tobacco Use   • Smoking status: Former Smoker     Types: Cigarettes     Quit date: 2008     Years since quittin.7   • Smokeless tobacco: Never Used   Substance and Sexual Activity   • Alcohol use: No   • Drug use: No   • Sexual activity: Defer       Family History   Problem Relation Age of Onset   • Heart failure Mother    • Heart failure Father    • Hypertension Brother    • Heart attack Other    • Heart disease Other    • Heart failure Other    • Diabetes Other    • Hypertension Other    • Clotting disorder Other        Review of Systems   Constitutional: Positive for malaise/fatigue. Negative for decreased appetite and diaphoresis.   HENT: Negative for nosebleeds.    Eyes: Negative for blurred vision.   Cardiovascular: Positive for dyspnea on exertion, near-syncope and palpitations. Negative for chest pain, claudication, cyanosis, irregular heartbeat, leg swelling, orthopnea, paroxysmal nocturnal dyspnea and syncope.   Respiratory: Positive for shortness of breath and snoring. Negative for sleep disturbances due to breathing.    Endocrine: Negative for cold intolerance and heat intolerance.   Hematologic/Lymphatic: Negative for bleeding problem. Does not bruise/bleed easily.   Skin: Negative for rash.   Musculoskeletal: Negative for falls and myalgias.   Gastrointestinal: Positive  "for abdominal pain (tenderness right lower quad). Negative for bloating, diarrhea, heartburn, melena and nausea.   Genitourinary: Negative for dysuria and hematuria.   Neurological: Positive for dizziness, light-headedness and weakness. Negative for brief paralysis, numbness and paresthesias.   Psychiatric/Behavioral: The patient does not have insomnia and is not nervous/anxious.         BP Readings from Last 5 Encounters:   05/31/22 160/98   08/18/20 140/100   10/14/19 128/84   03/26/19 (!) 184/96   11/16/18 126/74       Wt Readings from Last 5 Encounters:   05/31/22 105 kg (231 lb 3.2 oz)   08/18/20 105 kg (231 lb)   10/14/19 105 kg (231 lb)   03/26/19 105 kg (231 lb)   11/15/18 106 kg (233 lb 14.5 oz)       Objective     /98 (BP Location: Right arm)   Pulse 64   Ht 160 cm (63\")   Wt 105 kg (231 lb 3.2 oz)   BMI 40.96 kg/m²     Vitals and nursing note reviewed.   Eyes:      Pupils: Pupils are equal, round, and reactive to light.   HENT:      Head: Normocephalic.   Pulmonary:      Breath sounds: Normal breath sounds.   Cardiovascular:      Normal rate. Regular rhythm. loud S2.   Pulses:     Radial: 2+ bilaterally.     Dorsalis pedis: 2+ bilaterally.  Edema:     Peripheral edema absent.   Abdominal:      General: Bowel sounds are normal.      Palpations: Abdomen is soft.   Musculoskeletal: Normal range of motion.      Cervical back: Normal range of motion. Skin:     General: Skin is warm.   Neurological:      Mental Status: Alert and oriented to person, place, and time.            ECG 12 Lead    Date/Time: 5/31/2022 9:03 AM  Performed by: Macarena Rice APRN  Authorized by: Macarena Rice APRN   Comparison: compared with previous ECG from 8/18/2020  Similar to previous ECG  Rhythm: sinus rhythm  BPM: 64  Conduction: incomplete right bundle branch block  Q waves: V1 and V2                     Assessment & Plan   Diagnoses and all orders for this visit:    1. Near syncope (Primary)  -     Overnight Sleep " Oximetry Study; Future  -     CBC (No Diff); Future  -     Magnesium; Future  -     Hemoglobin A1c; Future  -     Stress Test With Myocardial Perfusion One Day; Future  -     Adult Transthoracic Echo Complete W/ Cont if Necessary Per Protocol; Future    2. PAF (paroxysmal atrial fibrillation) (HCC)  -     ECG 12 Lead  -     Comprehensive Metabolic Panel; Future  -     Magnesium; Future  -     Stress Test With Myocardial Perfusion One Day; Future  -     Adult Transthoracic Echo Complete W/ Cont if Necessary Per Protocol; Future    3. Incomplete RBBB  -     ECG 12 Lead  -     Stress Test With Myocardial Perfusion One Day; Future  -     Adult Transthoracic Echo Complete W/ Cont if Necessary Per Protocol; Future    4. Palpitations  -     Overnight Sleep Oximetry Study; Future  -     Comprehensive Metabolic Panel; Future  -     TSH; Future  -     T4, Free; Future  -     Magnesium; Future  -     Stress Test With Myocardial Perfusion One Day; Future  -     Adult Transthoracic Echo Complete W/ Cont if Necessary Per Protocol; Future    5. Essential hypertension  -     Overnight Sleep Oximetry Study; Future  -     Stress Test With Myocardial Perfusion One Day; Future  -     Adult Transthoracic Echo Complete W/ Cont if Necessary Per Protocol; Future    6. Hypercholesteremia  -     Lipid Panel; Future    7. Murmur, cardiac  -     Adult Transthoracic Echo Complete W/ Cont if Necessary Per Protocol; Future    8. Shortness of breath  -     Overnight Sleep Oximetry Study; Future  -     CBC (No Diff); Future  -     Stress Test With Myocardial Perfusion One Day; Future  -     Adult Transthoracic Echo Complete W/ Cont if Necessary Per Protocol; Future    9. RICE (dyspnea on exertion)  -     Stress Test With Myocardial Perfusion One Day; Future  -     Adult Transthoracic Echo Complete W/ Cont if Necessary Per Protocol; Future    10. Hypoglycemia  -     Hemoglobin A1c; Future    11. Acquired hypothyroidism  -     TSH; Future  -     T4,  Free; Future    12. Medication management      PAF/palpitations  -EKG today shows sinus rhythm with incomplete right bundle branch block and possibly LVH.  Q waves in V1 and 2 noted.  Findings are similar to prior EKG.  QTc is normal being 460 ms.  Continue current dose flecainide.  Should she have significant increase in heart rate she could take 1 extra dose of flecainide 50 mg.  For stroke prevention she is on Eliquis therapy and bleeding denied.  Continue same.  -Patient reports 1 episode of atrial fibrillation documented by apple watch.  No recurrence of palpitations since that time.  If has recurrence will consider placement of cardiac monitor.  - Lab ordered as noted above.    Hypertension  - Patient has had documented decrease in systolic blood pressure with standing.  At this time no change in current medications advised.  We discussed maintaining good hydration.  She is aware of safety issues and to avoid prolonged standing.  - Cardiac work-up ordered.  If all negative then consider tilt table study.    Dizziness/lightheadedness/near syncope  -According to patient she has annual carotid ultrasound.  Most recent was in November.  Please forward a copy of the results?    Shortness of breath  - Due to symptoms including near syncope and dyspnea on exertion advised repeat cardiac work-up.  Nuclear treadmill stress test and echocardiogram ordered.    Hypothyroidism/hyperglycemia  -Labs ordered as noted.    Based on patient's diagnosis and symptoms, an overnight oxygen study also ordered.    Further recommendations based on test results.  A 6-month follow-up visit scheduled.  Please call sooner for cardiac concerns.

## 2022-06-27 DIAGNOSIS — I48.0 PAF (PAROXYSMAL ATRIAL FIBRILLATION): ICD-10-CM

## 2022-06-27 RX ORDER — FLECAINIDE ACETATE 50 MG/1
TABLET ORAL
Qty: 180 TABLET | Refills: 3 | Status: SHIPPED | OUTPATIENT
Start: 2022-06-27

## 2022-06-27 RX ORDER — APIXABAN 5 MG/1
TABLET, FILM COATED ORAL
Qty: 180 TABLET | Refills: 3 | Status: SHIPPED | OUTPATIENT
Start: 2022-06-27

## 2022-07-06 ENCOUNTER — HOSPITAL ENCOUNTER (OUTPATIENT)
Dept: CARDIOLOGY | Facility: HOSPITAL | Age: 67
Discharge: HOME | End: 2022-07-06

## 2022-07-06 VITALS — HEIGHT: 63 IN | BODY MASS INDEX: 41.02 KG/M2 | WEIGHT: 231.48 LBS

## 2022-07-06 DIAGNOSIS — R00.2 PALPITATIONS: ICD-10-CM

## 2022-07-06 DIAGNOSIS — R06.09 DOE (DYSPNEA ON EXERTION): ICD-10-CM

## 2022-07-06 DIAGNOSIS — I48.0 PAF (PAROXYSMAL ATRIAL FIBRILLATION): ICD-10-CM

## 2022-07-06 DIAGNOSIS — R06.02 SHORTNESS OF BREATH: ICD-10-CM

## 2022-07-06 DIAGNOSIS — I10 ESSENTIAL HYPERTENSION: ICD-10-CM

## 2022-07-06 DIAGNOSIS — R55 NEAR SYNCOPE: ICD-10-CM

## 2022-07-06 DIAGNOSIS — I45.10 INCOMPLETE RBBB: ICD-10-CM

## 2022-07-06 DIAGNOSIS — R01.1 MURMUR, CARDIAC: ICD-10-CM

## 2022-07-06 LAB
AORTIC DIMENSIONLESS INDEX: 1 (DI)
BH CV ECHO MEAS - ACS: 1.86 CM
BH CV ECHO MEAS - AO MAX PG: 5.6 MMHG
BH CV ECHO MEAS - AO MEAN PG: 2.9 MMHG
BH CV ECHO MEAS - AO ROOT DIAM: 3.2 CM
BH CV ECHO MEAS - AO V2 MAX: 117.9 CM/SEC
BH CV ECHO MEAS - AO V2 VTI: 27.6 CM
BH CV ECHO MEAS - EDV(CUBED): 59.9 ML
BH CV ECHO MEAS - EF_3D-VOL: 55 %
BH CV ECHO MEAS - ESV(CUBED): 20.5 ML
BH CV ECHO MEAS - FS: 30.1 %
BH CV ECHO MEAS - IVS/LVPW: 1.04 CM
BH CV ECHO MEAS - IVSD: 1.24 CM
BH CV ECHO MEAS - LA DIMENSION: 3.8 CM
BH CV ECHO MEAS - LAT PEAK E' VEL: 10.7 CM/SEC
BH CV ECHO MEAS - LV MASS(C)D: 163.4 GRAMS
BH CV ECHO MEAS - LV MAX PG: 5.7 MMHG
BH CV ECHO MEAS - LV MEAN PG: 2.5 MMHG
BH CV ECHO MEAS - LV V1 MAX: 119.8 CM/SEC
BH CV ECHO MEAS - LV V1 VTI: 27.6 CM
BH CV ECHO MEAS - LVIDD: 3.9 CM
BH CV ECHO MEAS - LVIDS: 2.7 CM
BH CV ECHO MEAS - LVPWD: 1.19 CM
BH CV ECHO MEAS - MED PEAK E' VEL: 6.8 CM/SEC
BH CV ECHO MEAS - MV A MAX VEL: 68 CM/SEC
BH CV ECHO MEAS - MV DEC SLOPE: 409 CM/SEC2
BH CV ECHO MEAS - MV DEC TIME: 0.29 MSEC
BH CV ECHO MEAS - MV E MAX VEL: 54.9 CM/SEC
BH CV ECHO MEAS - MV E/A: 0.81
BH CV ECHO MEAS - MV MAX PG: 3.3 MMHG
BH CV ECHO MEAS - MV MEAN PG: 1.14 MMHG
BH CV ECHO MEAS - MV P1/2T: 54.7 MSEC
BH CV ECHO MEAS - MV V2 VTI: 28 CM
BH CV ECHO MEAS - MVA(P1/2T): 4 CM2
BH CV ECHO MEAS - PA V2 MAX: 77.3 CM/SEC
BH CV ECHO MEAS - RAP SYSTOLE: 10 MMHG
BH CV ECHO MEAS - RV MAX PG: 1.21 MMHG
BH CV ECHO MEAS - RV V1 MAX: 54.9 CM/SEC
BH CV ECHO MEAS - RV V1 VTI: 12.7 CM
BH CV ECHO MEAS - RVDD: 3.2 CM
BH CV ECHO MEAS - RVSP: 40.3 MMHG
BH CV ECHO MEAS - TR MAX PG: 30.3 MMHG
BH CV ECHO MEAS - TR MAX VEL: 275.3 CM/SEC
BH CV ECHO MEASUREMENTS AVERAGE E/E' RATIO: 6.27
BH CV REST NUCLEAR ISOTOPE DOSE: 10 MCI
BH CV STRESS NUCLEAR ISOTOPE DOSE: 30 MCI
BH CV STRESS RECOVERY BP: NORMAL MMHG
BH CV STRESS RECOVERY HR: 66 BPM
BH CV XLRA - TDI S': 12.9 CM/SEC
IVRT: 132 MSEC
LV EF NUC BP: 69 %
MAXIMAL PREDICTED HEART RATE: 152 BPM
MAXIMAL PREDICTED HEART RATE: 152 BPM
PERCENT MAX PREDICTED HR: 87.5 %
SINUS: 2.9 CM
STJ: 2.45 CM
STRESS BASELINE BP: NORMAL MMHG
STRESS BASELINE HR: 58 BPM
STRESS PERCENT HR: 103 %
STRESS POST ESTIMATED WORKLOAD: 4.6 METS
STRESS POST EXERCISE DUR MIN: 3 MIN
STRESS POST EXERCISE DUR SEC: 51 SEC
STRESS POST PEAK BP: NORMAL MMHG
STRESS POST PEAK HR: 133 BPM
STRESS TARGET HR: 129 BPM
STRESS TARGET HR: 129 BPM

## 2022-07-06 PROCEDURE — A9500 TC99M SESTAMIBI: HCPCS | Performed by: INTERNAL MEDICINE

## 2022-07-06 PROCEDURE — 78452 HT MUSCLE IMAGE SPECT MULT: CPT

## 2022-07-06 PROCEDURE — 93017 CV STRESS TEST TRACING ONLY: CPT

## 2022-07-06 PROCEDURE — 93018 CV STRESS TEST I&R ONLY: CPT | Performed by: INTERNAL MEDICINE

## 2022-07-06 PROCEDURE — 93306 TTE W/DOPPLER COMPLETE: CPT | Performed by: INTERNAL MEDICINE

## 2022-07-06 PROCEDURE — 0 TECHNETIUM SESTAMIBI: Performed by: INTERNAL MEDICINE

## 2022-07-06 PROCEDURE — 78452 HT MUSCLE IMAGE SPECT MULT: CPT | Performed by: INTERNAL MEDICINE

## 2022-07-06 PROCEDURE — 93306 TTE W/DOPPLER COMPLETE: CPT

## 2022-07-06 RX ADMIN — TECHNETIUM TC 99M SESTAMIBI 1 DOSE: 1 INJECTION INTRAVENOUS at 12:03

## 2022-07-06 RX ADMIN — TECHNETIUM TC 99M SESTAMIBI 1 DOSE: 1 INJECTION INTRAVENOUS at 10:07

## 2022-07-08 DIAGNOSIS — I10 ESSENTIAL HYPERTENSION: ICD-10-CM

## 2022-07-08 DIAGNOSIS — I48.0 PAF (PAROXYSMAL ATRIAL FIBRILLATION): ICD-10-CM

## 2022-07-08 RX ORDER — DILTIAZEM HYDROCHLORIDE 180 MG/1
180 CAPSULE, EXTENDED RELEASE ORAL DAILY
Qty: 30 CAPSULE | Refills: 11 | Status: SHIPPED | OUTPATIENT
Start: 2022-07-08

## 2022-12-06 ENCOUNTER — OFFICE VISIT (OUTPATIENT)
Dept: CARDIOLOGY | Facility: CLINIC | Age: 67
End: 2022-12-06

## 2022-12-06 VITALS
DIASTOLIC BLOOD PRESSURE: 72 MMHG | HEART RATE: 57 BPM | SYSTOLIC BLOOD PRESSURE: 124 MMHG | WEIGHT: 229.2 LBS | BODY MASS INDEX: 40.61 KG/M2 | HEIGHT: 63 IN

## 2022-12-06 DIAGNOSIS — I10 ESSENTIAL HYPERTENSION: Primary | ICD-10-CM

## 2022-12-06 DIAGNOSIS — I48.0 PAF (PAROXYSMAL ATRIAL FIBRILLATION): ICD-10-CM

## 2022-12-06 DIAGNOSIS — E78.00 HYPERCHOLESTEREMIA: ICD-10-CM

## 2022-12-06 DIAGNOSIS — Z79.899 LONG TERM CURRENT USE OF ANTIARRHYTHMIC DRUG: ICD-10-CM

## 2022-12-06 DIAGNOSIS — R06.02 SHORTNESS OF BREATH: ICD-10-CM

## 2022-12-06 DIAGNOSIS — Z98.890 HISTORY OF CARDIAC RADIOFREQUENCY ABLATION: ICD-10-CM

## 2022-12-06 DIAGNOSIS — Z79.01 CURRENT USE OF LONG TERM ANTICOAGULATION: ICD-10-CM

## 2022-12-06 PROCEDURE — 99214 OFFICE O/P EST MOD 30 MIN: CPT | Performed by: NURSE PRACTITIONER

## 2022-12-06 PROCEDURE — 93000 ELECTROCARDIOGRAM COMPLETE: CPT | Performed by: NURSE PRACTITIONER

## 2022-12-06 RX ORDER — NITROGLYCERIN 0.4 MG/1
TABLET SUBLINGUAL
Qty: 25 TABLET | Refills: 1 | Status: SHIPPED | OUTPATIENT
Start: 2022-12-06

## 2022-12-06 NOTE — PROGRESS NOTES
"Chief Complaint   Patient presents with   • Follow-up     Cardiac management   • Atrial Fibrillation     Has occasional flutter, she has discomfort if HR gets up to 150. She has noticed fast HR and some reports of AFIB on her watch .   • LABS     June 2022 results on chart   • Med Refill     No refills needed today. Had med list today       Subjective       Dunia Eduardo is a 66 y.o. female with hypertension, paroxysmal atrial fibrillation for which she has undergone cardioversion in the past.  She also has history of atrial flutter for which she has undergone ablation. Stress test done 4/17/2019 using modified Ian protocol showed blunted heart rate response but normal blood pressure.  Myocardial perfusion study reported as normal. Echocardiogram showed EF of 61 to 65%, mild MR, and moderate TR with RVSP of 38 mmHg.     At her last visit in May 2022 she admitted to symptoms of concern prompting repeat cardiac work-up.  Nuclear treadmill stress test showed changes secondary to breast attenuation and mild increase in blood pressure as well as heart rate response.  The dose of Cardizem was increased.  Echocardiogram showed mild LVH with EF of 56-60% and mild to moderate TR with RVSP of 40 mmHg.  Overnight oxygen monitor showed mild nocturnal oxygen desaturation.     Today she returns to the office for follow-up visit.  June 2022 labs showed normal thyroid, blood count, chemistry, magnesium.  LDL was 114.  Diet recommended with consideration of statin therapy if LDL increased next lab.  She admits to occasional palpitations.  In October she took an extra dose of flecainide with benefit.  Since that time the palpitations have been brief and without associated symptoms.  Recently she developed right knee pain for which she was given \"steroid injection\".  Her knee pain has not improved.  She also had episode of shortness of breath that occurred about 2 weeks ago but none since.  She is unsure if her blood pressure was " elevated at the time.    Cardiac History:    Past Surgical History:   Procedure Laterality Date   • ABLATION OF DYSRHYTHMIC FOCUS     • ARTERIAL ANEURYSM REPAIR     • CARDIAC ELECTROPHYSIOLOGY PROCEDURE N/A 11/15/2018    Ablation atrial flutter;  Surgeon: Phani Kidd MD;   • CARDIOVASCULAR STRESS TEST  09/24/2012    5 Min, 85% THR. BP-178/90. Pt had CP. Negative    • CARDIOVASCULAR STRESS TEST  08/01/2018    (Mod) 9 Min. 4.6 METS. 76% THR. BP- 197/97. Negative.   • CARDIOVASCULAR STRESS TEST  04/17/2019    (Mod) 9 Min. 4.6 METS. 77% THR. BP- 156/80. EF 68%. Negative.   • CARDIOVASCULAR STRESS TEST  07/06/2022    3 Min.51 Sec.4.6 METS. 87% THR. 160/97. EF 69%. Breast Attenuation   • CARDIOVERSION  01/23/2015    Converted    • CARDIOVERSION  10/10/2018    Converted to Sinus   • CHOLECYSTECTOMY     • ECHO - CONVERTED  08/16/2012    EF 65%. RVSP-41mmHg. R/O PFO   • ECHO - CONVERTED  09/24/2012    small PFO   • ECHO - CONVERTED  01/16/2015    EF 65%   • ECHO - CONVERTED  08/01/2018    EF 65%. RVSP- 40 mmHg.   • ECHO - CONVERTED  04/17/2019    EF 65%. Mild MR. Mod TR. RVSP- 38 mmHg.   • ECHO - CONVERTED  07/06/2022    EF 60%. Trace-Mild MR. RVSP- 40 mmHg   • HYSTERECTOMY     • OTHER SURGICAL HISTORY  12/2011    ce[facility]bral angiogram, [facility]   • OTHER SURGICAL HISTORY  2012    Cardionet-PAF   • OTHER SURGICAL HISTORY  11/02/2012    small PFO       Current Outpatient Medications   Medication Sig Dispense Refill   • dilTIAZem XR (DILACOR XR) 180 MG 24 hr capsule Take 1 capsule by mouth Daily. 30 capsule 11   • Eliquis 5 MG tablet tablet TAKE 1 TABLET BY MOUTH TWICE A  tablet 3   • flecainide (TAMBOCOR) 50 MG tablet TAKE 1 TABLET BY MOUTH TWICE A  tablet 3   • levothyroxine (SYNTHROID, LEVOTHROID) 50 MCG tablet Take 50 mcg by mouth Every Morning.     • lisinopril (PRINIVIL,ZESTRIL) 10 MG tablet TAKE 1 TABLET BY MOUTH EVERY DAY 90 tablet 3   • sertraline (ZOLOFT) 25 MG tablet Take 25 mg by mouth  Every Morning.     • nitroglycerin (NITROSTAT) 0.4 MG SL tablet 1 under the tongue as needed for angina, may repeat q5mins for up three doses 25 tablet 1     No current facility-administered medications for this visit.       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   • Atrial flutter (HCC)    • Gallbladder removed    • GERD (gastroesophageal reflux disease)    • Heart murmur    • Hx of cholecystectomy    • Hx of hysterectomy    • Hypercalcemia    • Hyperlipidemia    • Hypertension    • Hypothyroidism    • PAF (paroxysmal atrial fibrillation) (HCC)    • PONV (postoperative nausea and vomiting)    • Wears glasses    • Wears partial dentures        Social History     Socioeconomic History   • Marital status:    Tobacco Use   • Smoking status: Former     Packs/day: 1.00     Years: 10.00     Pack years: 10.00     Types: Cigarettes     Quit date: 2008     Years since quittin.2   • Smokeless tobacco: Never   Substance and Sexual Activity   • Alcohol use: No   • Drug use: No   • Sexual activity: Defer       Family History   Problem Relation Age of Onset   • Heart failure Mother    • Heart failure Father    • Hypertension Brother    • Heart attack Other    • Heart disease Other    • Heart failure Other    • Diabetes Other    • Hypertension Other    • Clotting disorder Other        Review of Systems   Constitutional: Positive for malaise/fatigue (no worse). Negative for decreased appetite and diaphoresis.   HENT: Negative for nosebleeds.    Eyes: Negative for blurred vision.   Cardiovascular: Positive for palpitations. Negative for chest pain, claudication, cyanosis, dyspnea on exertion, irregular heartbeat, leg swelling, near-syncope, orthopnea, paroxysmal nocturnal dyspnea and syncope.  "  Respiratory: Positive for shortness of breath. Negative for cough and sleep disturbances due to breathing.    Endocrine: Negative for cold intolerance and heat intolerance.   Hematologic/Lymphatic: Does not bruise/bleed easily.   Skin: Negative for rash.   Musculoskeletal: Positive for joint pain (right knee). Negative for myalgias.   Gastrointestinal: Negative for heartburn, melena and nausea.   Genitourinary: Negative for dysuria and hematuria.   Neurological: Negative for dizziness and light-headedness.   Psychiatric/Behavioral: The patient does not have insomnia and is not nervous/anxious.         BP Readings from Last 5 Encounters:   12/06/22 124/72   05/31/22 160/98   08/18/20 140/100   10/14/19 128/84   03/26/19 (!) 184/96       Wt Readings from Last 5 Encounters:   12/06/22 104 kg (229 lb 3.2 oz)   07/06/22 105 kg (231 lb 7.7 oz)   05/31/22 105 kg (231 lb 3.2 oz)   08/18/20 105 kg (231 lb)   10/14/19 105 kg (231 lb)       Objective        /72 (BP Location: Left arm, Patient Position: Sitting)   Pulse 57   Ht 160 cm (62.99\")   Wt 104 kg (229 lb 3.2 oz)   BMI 40.61 kg/m²     Vitals and nursing note reviewed.   Constitutional:       Appearance: Healthy appearance. Not in distress.   Eyes:      Conjunctiva/sclera: Conjunctivae normal.      Pupils: Pupils are equal, round, and reactive to light.   HENT:      Head: Normocephalic.   Neck:      Vascular: No carotid bruit.   Pulmonary:      Effort: Pulmonary effort is normal.      Breath sounds: Normal breath sounds.   Cardiovascular:      PMI at left midclavicular line. Normal rate. Regular rhythm. loud S2.   Pulses:     Intact distal pulses.   Edema:     Peripheral edema absent.   Abdominal:      General: Bowel sounds are normal.      Palpations: Abdomen is soft.   Musculoskeletal: Normal range of motion.      Cervical back: Normal range of motion and neck supple. Skin:     General: Skin is warm and dry.   Neurological:      Mental Status: Alert, " oriented to person, place, and time and oriented to person, place and time.            ECG 12 Lead    Date/Time: 12/6/2022 2:10 PM  Performed by: Macarena Rice APRN  Authorized by: Macarena Rice APRN   Comparison: compared with previous ECG from 5/31/2022  Similar to previous ECG  Comparison to previous ECG: Previous sinus with normal KS and QTc and mild LVH  Rhythm: sinus bradycardia  BPM: 57  Other findings: left ventricular hypertrophy  Comments: KS interval 165 ms, QT/QTc 405/399 ms                 Assessment & Plan   Diagnoses and all orders for this visit:    1. Essential hypertension (Primary)    2. Hypercholesteremia    3. PAF (paroxysmal atrial fibrillation) (HCC)  -     ECG 12 Lead    4. History of cardiac radiofrequency ablation    5. Current use of long term anticoagulation    6. Long term current use of antiarrhythmic drug    7. Shortness of breath  -     nitroglycerin (NITROSTAT) 0.4 MG SL tablet; 1 under the tongue as needed for angina, may repeat q5mins for up three doses  Dispense: 25 tablet; Refill: 1      Hypertension  -BP controlled.  Continue current hypertensive agents.  -Dash diet encouraged    Hypercholesterolemia  -.  Continue diet management at this time.  If LDL up next lab results then consider statin therapy    PAF/history ablation/medication management  -EKG today shows sinus bradycardia at a rate of 57 bpm.  Mild LVH and incomplete right bundle branch block noted which is similar to prior.  -Patient admits to infrequent palpitations.  She took an extra dose of flecainide in October with benefit but none since.  -Continue good hydration and avoiding caffeine.  -Continue current dose flecainide.  Continue current dose calcium channel blocker.    Shortness of air  -Patient admits to an episode of shortness of air and chest tightness that occurred about 2 weeks ago.  She has had none since.  A prescription for Nitrostat given with an instruction on how to use should she have  recurrence of the symptoms.  Otherwise, we reviewed recent cardiac work-up showing normal LV function and no ischemia.  No repeat cardiac testing ordered at this time.  She understands to call if she has new or worsening symptoms.    6-month follow-up visit scheduled.

## 2022-12-18 DIAGNOSIS — I10 ESSENTIAL HYPERTENSION: ICD-10-CM

## 2022-12-19 RX ORDER — LISINOPRIL 10 MG/1
TABLET ORAL
Qty: 90 TABLET | Refills: 3 | Status: SHIPPED | OUTPATIENT
Start: 2022-12-19

## 2023-08-02 DIAGNOSIS — I48.0 PAF (PAROXYSMAL ATRIAL FIBRILLATION): ICD-10-CM

## 2023-08-02 RX ORDER — FLECAINIDE ACETATE 50 MG/1
TABLET ORAL
Qty: 180 TABLET | Refills: 3 | Status: SHIPPED | OUTPATIENT
Start: 2023-08-02

## 2023-08-03 DIAGNOSIS — I48.0 PAF (PAROXYSMAL ATRIAL FIBRILLATION): ICD-10-CM

## 2023-08-04 ENCOUNTER — TELEPHONE (OUTPATIENT)
Dept: CARDIOLOGY | Facility: CLINIC | Age: 68
End: 2023-08-04
Payer: MEDICARE

## 2023-08-04 NOTE — TELEPHONE ENCOUNTER
I called patient to inquire about ordered EKG from July 2023 . I have called PCP office  and patient  didn't come in for EKG.  We have received refill request for Eliquis with large copay , I have left patient a message  to discuss this with options for assistance .

## 2023-08-07 NOTE — TELEPHONE ENCOUNTER
Patient returned  telephone call and will go tomorrow to have labs and EKG at PCP office.  Orders faxed to [phone]

## 2023-08-10 RX ORDER — APIXABAN 5 MG/1
TABLET, FILM COATED ORAL
Qty: 60 TABLET | Refills: 11 | Status: SHIPPED | OUTPATIENT
Start: 2023-08-10

## 2023-08-10 RX ORDER — DILTIAZEM HYDROCHLORIDE 180 MG/1
CAPSULE, EXTENDED RELEASE ORAL
Qty: 90 CAPSULE | Refills: 3 | Status: SHIPPED | OUTPATIENT
Start: 2023-08-10

## 2023-08-11 DIAGNOSIS — R00.2 PALPITATIONS: ICD-10-CM

## 2023-08-11 DIAGNOSIS — Z79.899 LONG TERM CURRENT USE OF ANTIARRHYTHMIC DRUG: ICD-10-CM

## 2023-08-11 DIAGNOSIS — I48.0 PAF (PAROXYSMAL ATRIAL FIBRILLATION): ICD-10-CM

## 2023-08-11 PROCEDURE — 93000 ELECTROCARDIOGRAM COMPLETE: CPT | Performed by: NURSE PRACTITIONER

## 2023-09-27 ENCOUNTER — OFFICE VISIT (OUTPATIENT)
Dept: CARDIOLOGY | Facility: CLINIC | Age: 68
End: 2023-09-27
Payer: MEDICARE

## 2023-09-27 VITALS
HEIGHT: 62 IN | SYSTOLIC BLOOD PRESSURE: 128 MMHG | BODY MASS INDEX: 41.96 KG/M2 | DIASTOLIC BLOOD PRESSURE: 68 MMHG | WEIGHT: 228 LBS | HEART RATE: 66 BPM

## 2023-09-27 DIAGNOSIS — R00.2 PALPITATIONS: ICD-10-CM

## 2023-09-27 DIAGNOSIS — Z79.899 LONG TERM CURRENT USE OF ANTIARRHYTHMIC DRUG: ICD-10-CM

## 2023-09-27 DIAGNOSIS — I48.0 PAF (PAROXYSMAL ATRIAL FIBRILLATION): Primary | ICD-10-CM

## 2023-09-27 DIAGNOSIS — I10 ESSENTIAL HYPERTENSION: ICD-10-CM

## 2023-09-27 DIAGNOSIS — Z79.01 CURRENT USE OF LONG TERM ANTICOAGULATION: ICD-10-CM

## 2023-09-27 DIAGNOSIS — E78.00 HYPERCHOLESTEREMIA: ICD-10-CM

## 2023-09-27 DIAGNOSIS — Z98.890 HISTORY OF CARDIAC RADIOFREQUENCY ABLATION: ICD-10-CM

## 2023-09-27 DIAGNOSIS — R42 DIZZINESS: ICD-10-CM

## 2023-09-27 RX ORDER — LISINOPRIL 10 MG/1
10 TABLET ORAL DAILY
Qty: 90 TABLET | Refills: 3 | Status: SHIPPED | OUTPATIENT
Start: 2023-09-27

## 2023-09-27 RX ORDER — DILTIAZEM HYDROCHLORIDE 180 MG/1
180 CAPSULE, EXTENDED RELEASE ORAL DAILY
Qty: 90 CAPSULE | Refills: 3 | Status: SHIPPED | OUTPATIENT
Start: 2023-09-27

## 2023-09-27 RX ORDER — FLECAINIDE ACETATE 50 MG/1
50 TABLET ORAL 2 TIMES DAILY
Qty: 180 TABLET | Refills: 3 | Status: SHIPPED | OUTPATIENT
Start: 2023-09-27

## 2023-09-27 NOTE — PROGRESS NOTES
"Chief Complaint   Patient presents with    Follow-up     Pt is here for cardiac follow up.  Pt states she has been having a lot of afib.  She also reports dizziness.  She denies CP or SOB.  Pt does not take a daily ASA.      Med Refill     Pt request 90 (except for Eliquis) day refills to be sent to  [facility] in [place].   Medications were verified by the pt.      Lab Work     Pt states their last labs were 8/9/23.         Subjective       Dunia Eduardo is a 67 y.o. female with HTN, PAF and cardioversion in past.  She also had atrial flutter and underwent ablation.  Cardiac work-up 2022 showed increased blood pressure and heart rate response and breast attenuation.  Cardizem dose increased.  Echocardiogram showed mild LVH with a EF around 60%, mild to moderate TR and RVSP of 40 mmHg.  Overnight oxygen monitor showed mild nocturnal oxygen desaturation.    Today she returns the office for follow-up visit.  She denies chest pain or increase in shortness of breath.  She continues to have palpitations more often than prior and episodes of dizziness that occurs randomly. About 2 weeks ago she wore a cardiac monitor for 24 hours but states she did not experience any symptoms during that time period.  She continues to have episodes of palpitations she describes as \"atrial fibrillation\".    In July of this year she underwent cerebral angiogram and no change in aneurysm noted over numerous years.  Patient was released to return as needed.    Cardiac History:    Past Surgical History:   Procedure Laterality Date    ABLATION OF DYSRHYTHMIC FOCUS      ARTERIAL ANEURYSM REPAIR      CARDIAC ELECTROPHYSIOLOGY PROCEDURE N/A 11/15/2018    Ablation atrial flutter;  Surgeon: Phani Kidd MD;    CARDIOVASCULAR STRESS TEST  09/24/2012    5 Min, 85% THR. BP-178/90. Pt had CP. Negative     CARDIOVASCULAR STRESS TEST  08/01/2018    (Mod) 9 Min. 4.6 METS. 76% THR. BP- 197/97. Negative.    CARDIOVASCULAR STRESS TEST  04/17/2019    (Mod) 9 " Min. 4.6 METS. 77% THR. BP- 156/80. EF 68%. Negative.    CARDIOVASCULAR STRESS TEST  07/06/2022    3 Min.51 Sec.4.6 METS. 87% THR. 160/97. EF 69%. Breast Attenuation    CARDIOVERSION  01/23/2015    Converted     CARDIOVERSION  10/10/2018    Converted to Sinus    CHOLECYSTECTOMY      ECHO - CONVERTED  08/16/2012    EF 65%. RVSP-41mmHg. R/O PFO    ECHO - CONVERTED  09/24/2012    small PFO    ECHO - CONVERTED  01/16/2015    EF 65%    ECHO - CONVERTED  08/01/2018    EF 65%. RVSP- 40 mmHg.    ECHO - CONVERTED  04/17/2019    EF 65%. Mild MR. Mod TR. RVSP- 38 mmHg.    ECHO - CONVERTED  07/06/2022    EF 60%. Trace-Mild MR. RVSP- 40 mmHg    HYSTERECTOMY      OTHER SURGICAL HISTORY  12/2011    cerebral angiogram, [facility] [place]    OTHER SURGICAL HISTORY  2012    Cardionet-PAF    OTHER SURGICAL HISTORY  11/02/2012    small PFO       Current Outpatient Medications   Medication Sig Dispense Refill    apixaban (Eliquis) 5 MG tablet tablet One tablet twice a day 60 tablet 11    dilTIAZem XR (Dilt-XR) 180 MG 24 hr capsule Take 1 capsule by mouth Daily. 90 capsule 3    flecainide (TAMBOCOR) 50 MG tablet Take 1 tablet by mouth 2 (Two) Times a Day. 180 tablet 3    levothyroxine (SYNTHROID, LEVOTHROID) 50 MCG tablet Take 1 tablet by mouth Every Morning.      lisinopril (PRINIVIL,ZESTRIL) 10 MG tablet Take 1 tablet by mouth Daily. 90 tablet 3    nitroglycerin (NITROSTAT) 0.4 MG SL tablet 1 under the tongue as needed for angina, may repeat q5mins for up three doses 25 tablet 1    sertraline (ZOLOFT) 25 MG tablet Take 1 tablet by mouth Every Morning.       No current facility-administered medications for this visit.       Codeine, Morphine and related, and Pradaxa [dabigatran etexilate mesylate]    Past Medical History:   Diagnosis Date    Aneurysm     brain, ruptured with clipping, 8years ago    Aneurysm     2 small cerebral, followed by Dr Gonzalez Lamb    Atrial flutter     Gallbladder removed     GERD (gastroesophageal reflux  disease)     Heart murmur     Hx of cholecystectomy     Hx of hysterectomy     Hypercalcemia     Hyperlipidemia     Hypertension     Hypothyroidism     PAF (paroxysmal atrial fibrillation)     PONV (postoperative nausea and vomiting)     Wears glasses     Wears partial dentures        Social History     Socioeconomic History    Marital status:    Tobacco Use    Smoking status: Former     Packs/day: 1.00     Years: 10.00     Pack years: 10.00     Types: Cigarettes     Quit date: 9/14/2008     Years since quitting: 15.0    Smokeless tobacco: Never   Substance and Sexual Activity    Alcohol use: No    Drug use: No    Sexual activity: Defer       Family History   Problem Relation Age of Onset    Heart failure Mother     Heart failure Father     Hypertension Brother     Heart attack Other     Heart disease Other     Heart failure Other     Diabetes Other     Hypertension Other     Clotting disorder Other        Review of Systems   Constitutional: Positive for malaise/fatigue. Negative for diaphoresis and fever.   Eyes:  Negative for visual disturbance.   Cardiovascular:  Positive for palpitations. Negative for chest pain, leg swelling and syncope.   Respiratory:  Positive for sleep disturbances due to breathing (uses CPAP).    Endocrine: Negative for polydipsia, polyphagia and polyuria.   Hematologic/Lymphatic: Negative for bleeding problem. Bruises/bleeds easily.   Skin:  Negative for color change.   Musculoskeletal:  Negative for falls.   Neurological:  Positive for dizziness. Negative for headaches, light-headedness, loss of balance and weakness.      BP Readings from Last 5 Encounters:   09/27/23 128/68   12/06/22 124/72   05/31/22 160/98   08/18/20 140/100   10/14/19 128/84       Wt Readings from Last 5 Encounters:   09/27/23 103 kg (228 lb)   12/06/22 104 kg (229 lb 3.2 oz)   07/06/22 105 kg (231 lb 7.7 oz)   05/31/22 105 kg (231 lb 3.2 oz)   08/18/20 105 kg (231 lb)       Objective     Labs 8/9/2023 per  "PCP: CBC in normal range, TSH 2.83, potassium 4.6, sodium 141, ALP 78, AST 21, ALT 16 magnesium normal    /68 (BP Location: Right arm, Patient Position: Sitting)   Pulse 66   Ht 157.5 cm (62\")   Wt 103 kg (228 lb)   BMI 41.70 kg/m²     Vitals and nursing note reviewed.   Constitutional:       Appearance: Healthy appearance. Not in distress.   Eyes:      Conjunctiva/sclera: Conjunctivae normal.      Pupils: Pupils are equal, round, and reactive to light.   HENT:      Head: Normocephalic.   Pulmonary:      Effort: Pulmonary effort is normal.      Breath sounds: Normal breath sounds.   Cardiovascular:      PMI at left midclavicular line. Normal rate. Regular rhythm.      Murmurs: There is no murmur.   Pulses:     Intact distal pulses.   Edema:     Peripheral edema absent.   Abdominal:      General: Bowel sounds are normal.      Palpations: Abdomen is soft.   Musculoskeletal: Normal range of motion.      Cervical back: Normal range of motion and neck supple. Skin:     General: Skin is warm and dry.   Neurological:      Mental Status: Alert, oriented to person, place, and time and oriented to person, place and time.          ECG 12 Lead    Date/Time: 9/27/2023 3:30 PM  Performed by: Macarena Rice APRN  Authorized by: Macarena Rice APRN   Comparison: compared with previous ECG from 8/8/2023  Rhythm: sinus rhythm  Rate: normal  BPM: 66  Conduction: incomplete right bundle branch block  QRS axis: left             Assessment & Plan   Diagnoses and all orders for this visit:    1. PAF (paroxysmal atrial fibrillation) (Primary)  -     flecainide (TAMBOCOR) 50 MG tablet; Take 1 tablet by mouth 2 (Two) Times a Day.  Dispense: 180 tablet; Refill: 3  -     apixaban (Eliquis) 5 MG tablet tablet; One tablet twice a day  Dispense: 60 tablet; Refill: 11  -     ECG 12 Lead    2. Palpitations  -     Mobile Cardiac Outpatient Telemetry; Future    3. History of cardiac radiofrequency ablation    4. Current use of long term " anticoagulation    5. Long term current use of antiarrhythmic drug  -     ECG 12 Lead    6. Essential hypertension  -     lisinopril (PRINIVIL,ZESTRIL) 10 MG tablet; Take 1 tablet by mouth Daily.  Dispense: 90 tablet; Refill: 3    7. Hypercholesteremia    8. Dizziness  -     Mobile Cardiac Outpatient Telemetry; Future    Other orders  -     dilTIAZem XR (Dilt-XR) 180 MG 24 hr capsule; Take 1 capsule by mouth Daily.  Dispense: 90 capsule; Refill: 3      PAF/hx ablation/medication management  -EKG sinus,  ms, QTc 415 ms  -Recent labs unremarkable  -Reports more palpitations  -Event cardiac monitor ordered  -Continue flecainide 50 mg twice daily, diltiazem 180 mg daily  -For stroke prevention continue Eliquis, bleeding denied    Hypertension  -BP controlled  -DASH diet, weight loss  -Continue lisinopril, diltiazem    Hypercholesterolemia  -Last lipid panel showed .  -Continue diet  -Recheck lipids next lab order    Dizziness  -Recent labs unremarkable  -Patient reports carotid ultrasound unremarkable  -Patient will follow up with PCP regarding symptom.    Further recommendations based on monitor results    6-month follow-up visit scheduled.

## 2023-09-27 NOTE — ACP (ADVANCE CARE PLANNING)
Advance Care Planning   ACP discussion was held with the patient during this visit. Patient does not have an advance directive, declines further assistance.

## 2023-11-08 ENCOUNTER — TELEPHONE (OUTPATIENT)
Dept: CARDIOLOGY | Facility: CLINIC | Age: 68
End: 2023-11-08

## 2023-11-08 NOTE — TELEPHONE ENCOUNTER
Hub staff attempted to follow warm transfer process and was unsuccessful     Caller: Dunia Eduardo    Relationship to patient: Self    Best call back number: [phone]    Patient is needing: PATIENT RETURNING CALL TO [facility] REGARDING TEST RESULTS.    SHE WILL BE AT HER DAUGHTER[place] & WILL CALL BACK TOMORROW IF SHE MISSES THE CALL BACK DUE TO NO SERVICE.

## 2023-11-09 RX ORDER — DILTIAZEM HYDROCHLORIDE 240 MG/1
240 CAPSULE, COATED, EXTENDED RELEASE ORAL DAILY
Qty: 90 CAPSULE | Refills: 3 | Status: SHIPPED | OUTPATIENT
Start: 2023-11-09

## 2023-11-09 NOTE — TELEPHONE ENCOUNTER
Per cardiac event monitor result recommendation for patient to increase their diltiazem and to watch their BP.

## 2024-04-11 ENCOUNTER — OFFICE VISIT (OUTPATIENT)
Dept: CARDIOLOGY | Facility: CLINIC | Age: 69
End: 2024-04-11
Payer: MEDICARE

## 2024-04-11 VITALS
WEIGHT: 219.4 LBS | HEIGHT: 62 IN | DIASTOLIC BLOOD PRESSURE: 70 MMHG | HEART RATE: 70 BPM | BODY MASS INDEX: 40.37 KG/M2 | SYSTOLIC BLOOD PRESSURE: 118 MMHG

## 2024-04-11 DIAGNOSIS — Z79.899 LONG TERM CURRENT USE OF ANTIARRHYTHMIC DRUG: ICD-10-CM

## 2024-04-11 DIAGNOSIS — I10 ESSENTIAL HYPERTENSION: ICD-10-CM

## 2024-04-11 DIAGNOSIS — R07.9 CHEST PAIN, UNSPECIFIED TYPE: ICD-10-CM

## 2024-04-11 DIAGNOSIS — R53.83 OTHER FATIGUE: ICD-10-CM

## 2024-04-11 DIAGNOSIS — I48.0 PAF (PAROXYSMAL ATRIAL FIBRILLATION): Primary | ICD-10-CM

## 2024-04-11 DIAGNOSIS — E78.00 HYPERCHOLESTEREMIA: ICD-10-CM

## 2024-04-11 DIAGNOSIS — R00.2 PALPITATIONS: ICD-10-CM

## 2024-04-11 DIAGNOSIS — Z98.890 HISTORY OF CARDIAC RADIOFREQUENCY ABLATION: ICD-10-CM

## 2024-04-11 PROCEDURE — 99214 OFFICE O/P EST MOD 30 MIN: CPT | Performed by: NURSE PRACTITIONER

## 2024-04-11 PROCEDURE — 1159F MED LIST DOCD IN RCRD: CPT | Performed by: NURSE PRACTITIONER

## 2024-04-11 PROCEDURE — 1160F RVW MEDS BY RX/DR IN RCRD: CPT | Performed by: NURSE PRACTITIONER

## 2024-04-11 PROCEDURE — 93000 ELECTROCARDIOGRAM COMPLETE: CPT | Performed by: NURSE PRACTITIONER

## 2024-04-11 PROCEDURE — 3078F DIAST BP <80 MM HG: CPT | Performed by: NURSE PRACTITIONER

## 2024-04-11 PROCEDURE — 3074F SYST BP LT 130 MM HG: CPT | Performed by: NURSE PRACTITIONER

## 2024-04-11 NOTE — PROGRESS NOTES
Chief Complaint   Patient presents with    Follow-up     Cardiac management    Atrial Fibrillation     Patient reports having multiple episodes of AFIB . She has taken extra doses of flecainide numerous times.   She had previously had bronchitis and was prescribed albuterol  inhaler and AFIB episodes started soon after .    LABS     Had routine labs per PCP, will request records if needed    Med Refill     No refills needed today       Lary Eduardo is a 68 y.o. female with HTN, PAF and cardioversion in past. She also had atrial flutter and underwent ablation. Cardiac work-up 2022 showed increased blood pressure and heart rate response and breast attenuation. Cardizem dose increased. Echocardiogram showed mild LVH with a EF around 60%, mild to moderate TR and RVSP of 40 mmHg. Overnight oxygen monitor showed mild nocturnal oxygen desaturation. She also has history of cerebral aneurysm followed by angiograms and due to no change for numerous years was advised to return on a as needed basis.  On 11/6/2023 cardiac monitor worn 27 days showed rare PACs, PVCs and 2 short runs SVT.  No significant pauses were noted.  Patient had dizziness with sinus tachycardia.  The dose of Cardizem was increased.    Today she returns the office for follow-up visit.  She admits to improvement of dizziness.  Her main concern has been recent episodes of palpitations recorded as atrial fibrillation per home monitor.  Most recent occurred April 6 at which time her heart rate was in the 150s and ECG showed atrial fibrillation.  After taking extra dose flecainide she returned to normal rhythm in about an hour.  During that time she experienced chest heaviness and discomfort that radiated to her right shoulder and down right arm.      Cardiac History:    Past Surgical History:   Procedure Laterality Date    ABLATION OF DYSRHYTHMIC FOCUS      ARTERIAL ANEURYSM REPAIR      CARDIAC ELECTROPHYSIOLOGY PROCEDURE N/A 11/15/2018     Ablation atrial flutter;  Surgeon: Phani Kidd MD;    CARDIOVASCULAR STRESS TEST  09/24/2012    5 Min, 85% THR. BP-178/90. Pt had CP. Negative     CARDIOVASCULAR STRESS TEST  08/01/2018    (Mod) 9 Min. 4.6 METS. 76% THR. BP- 197/97. Negative.    CARDIOVASCULAR STRESS TEST  04/17/2019    (Mod) 9 Min. 4.6 METS. 77% THR. BP- 156/80. EF 68%. Negative.    CARDIOVASCULAR STRESS TEST  07/06/2022    3 Min.51 Sec.4.6 METS. 87% THR. 160/97. EF 69%. Breast Attenuation    CARDIOVERSION  01/23/2015    Converted     CARDIOVERSION  10/10/2018    Converted to Sinus    CHOLECYSTECTOMY      ECHO - CONVERTED  08/16/2012    EF 65%. RVSP-41mmHg. R/O PFO    ECHO - CONVERTED  09/24/2012    small PFO    ECHO - CONVERTED  01/16/2015    EF 65%    ECHO - CONVERTED  08/01/2018    EF 65%. RVSP- 40 mmHg.    ECHO - CONVERTED  04/17/2019    EF 65%. Mild MR. Mod TR. RVSP- 38 mmHg.    ECHO - CONVERTED  07/06/2022    EF 60%. Trace-Mild MR. RVSP- 40 mmHg    HYSTERECTOMY      OTHER SURGICAL HISTORY  12/2011    cerebral angiogram, [facility] [place]    OTHER SURGICAL HISTORY  2012    Cardionet-PAF    OTHER SURGICAL HISTORY  11/02/2012    small PFO    OTHER SURGICAL HISTORY  11/06/2023    MCOT- > 27 Days. Avg 68. .Two SVT.       Current Outpatient Medications   Medication Sig Dispense Refill    apixaban (Eliquis) 5 MG tablet tablet One tablet twice a day 60 tablet 11    dilTIAZem CD (CARDIZEM CD) 240 MG 24 hr capsule Take 1 capsule by mouth Daily. 90 capsule 3    flecainide (TAMBOCOR) 50 MG tablet Take 1 tablet by mouth 2 (Two) Times a Day. 180 tablet 3    levothyroxine (SYNTHROID, LEVOTHROID) 50 MCG tablet Take 1 tablet by mouth Every Morning.      lisinopril (PRINIVIL,ZESTRIL) 10 MG tablet Take 1 tablet by mouth Daily. 90 tablet 3    nitroglycerin (NITROSTAT) 0.4 MG SL tablet 1 under the tongue as needed for angina, may repeat q5mins for up three doses 25 tablet 1    sertraline (ZOLOFT) 25 MG tablet Take 1 tablet by mouth Every Morning.        No current facility-administered medications for this visit.       Codeine, Morphine and related, Pradaxa [dabigatran etexilate mesylate], and Doxycycline    Past Medical History:   Diagnosis Date    Aneurysm     brain, ruptured with clipping, 8years ago    Aneurysm     2 small cerebral, followed by Dr Gonzalez Lamb    Atrial flutter     Gallbladder removed     GERD (gastroesophageal reflux disease)     Heart murmur     Hx of cholecystectomy     Hx of hysterectomy     Hypercalcemia     Hyperlipidemia     Hypertension     Hypothyroidism     PAF (paroxysmal atrial fibrillation)     PONV (postoperative nausea and vomiting)     Wears glasses     Wears partial dentures        Social History     Socioeconomic History    Marital status:    Tobacco Use    Smoking status: Former     Current packs/day: 0.00     Average packs/day: 1 pack/day for 10.0 years (10.0 ttl pk-yrs)     Types: Cigarettes     Start date: 9/14/1998     Quit date: 9/14/2008     Years since quitting: 15.5    Smokeless tobacco: Never   Vaping Use    Vaping status: Never Used   Substance and Sexual Activity    Alcohol use: No    Drug use: No    Sexual activity: Defer       Family History   Problem Relation Age of Onset    Heart failure Mother     Heart failure Father     Hypertension Brother     Heart attack Other     Heart disease Other     Heart failure Other     Diabetes Other     Hypertension Other     Clotting disorder Other        Review of Systems   Constitutional: Positive for decreased appetite and weight loss. Negative for diaphoresis and fever.   Cardiovascular:  Positive for chest pain and palpitations.   Respiratory:  Positive for cough and shortness of breath. Negative for sleep disturbances due to breathing.    Skin:  Negative for color change.   Neurological:  Positive for dizziness.        BP Readings from Last 5 Encounters:   04/11/24 118/70   09/27/23 128/68   12/06/22 124/72   05/31/22 160/98   08/18/20 140/100       Wt  "Readings from Last 5 Encounters:   04/11/24 99.5 kg (219 lb 6.4 oz)   09/27/23 103 kg (228 lb)   12/06/22 104 kg (229 lb 3.2 oz)   07/06/22 105 kg (231 lb 7.7 oz)   05/31/22 105 kg (231 lb 3.2 oz)       Objective     /70 (BP Location: Left arm, Patient Position: Sitting)   Pulse 70   Ht 157.5 cm (62\")   Wt 99.5 kg (219 lb 6.4 oz)   BMI 40.13 kg/m²     Vitals and nursing note reviewed.   Constitutional:       Appearance: Healthy appearance. Not in distress.   Eyes:      Conjunctiva/sclera: Conjunctivae normal.      Pupils: Pupils are equal, round, and reactive to light.   HENT:      Head: Normocephalic.   Pulmonary:      Effort: Pulmonary effort is normal.      Breath sounds: Normal breath sounds.   Cardiovascular:      PMI at left midclavicular line. Normal rate. Regular rhythm. loud S2.    Edema:     Peripheral edema absent.   Abdominal:      General: Bowel sounds are normal.      Palpations: Abdomen is soft.   Musculoskeletal: Normal range of motion.      Cervical back: Normal range of motion and neck supple. Skin:     General: Skin is warm and dry.   Neurological:      Mental Status: Alert, oriented to person, place, and time and oriented to person, place and time.            ECG 12 Lead    Date/Time: 4/11/2024 12:40 PM  Performed by: Macarena Rice APRN    Authorized by: Macarena Rice APRN  Comparison: compared with previous ECG from 9/27/2023  Comparison to previous ECG: Sinus, incomplete RBBB               Assessment & Plan   Diagnoses and all orders for this visit:    1. PAF (paroxysmal atrial fibrillation) (Primary)  -     Stress Test With Myocardial Perfusion One Day; Future  -     Adult Transthoracic Echo Complete W/ Cont if Necessary Per Protocol; Future    2. Palpitations  -     Stress Test With Myocardial Perfusion One Day; Future  -     Adult Transthoracic Echo Complete W/ Cont if Necessary Per Protocol; Future    3. History of cardiac radiofrequency ablation  -     Stress Test With " Myocardial Perfusion One Day; Future  -     Adult Transthoracic Echo Complete W/ Cont if Necessary Per Protocol; Future    4. Essential hypertension    5. Hypercholesteremia    6. Chest pain, unspecified type  -     Stress Test With Myocardial Perfusion One Day; Future  -     Adult Transthoracic Echo Complete W/ Cont if Necessary Per Protocol; Future    7. Other fatigue  -     Stress Test With Myocardial Perfusion One Day; Future  -     Adult Transthoracic Echo Complete W/ Cont if Necessary Per Protocol; Future    Other orders  -     ECG 12 Lead      PAF/history ablation/medication management  -Recent recurrence of palpitations, heart rate in the 150s  -EKG today sinus rhythm with normal ME and QT intervals.  -Patient admits to associated chest pain and shortness of air with palpitations.  Nuclear treadmill stress test ordered to look for patient's heart rate and rhythm response to exertion versus rest as well as evidence of ischemia.  Echocardiogram ordered to look at overall LV function, cardiac output and PA pressure as well as valvular structure.  -For stroke prevention continue Eliquis, bleeding denied  -Continue current dose flecainide being 50 mg twice a day with extra 1 tablet if needed for breakthrough atrial fibrillation.  -Encouraged to avoid caffeine.  Patient had recent URI and used inhaler once resulting in increased heart rate.  Patient is no longer using inhaler nor cough medicine.  Fever or persistent respiratory symptoms currently denied.  -Results of extended cardiac monitor were reviewed: Sinus tachycardia associated with dizziness  -BP stable therefore recommend continue increased dose Cardizem 240 mg daily.    Patient is to having recent labs.  Please forward copy results.  Of note labs from 2023 showed normal electrolytes and normal thyroid function.    HTN  -BP controlled  -DASH diet, weight loss efforts  -Continue lisinopril, diltiazem     Hypercholesterolemia  -Last lipid panel showed LDL  114.  -Continue diet  -Recheck lipids next lab order     Dizziness  -Admits dizziness has improved  -According to patient recent carotid ultrasound was unremarkable.    Further recommendations based on test results               Electronically signed by ESTEBAN Rivera,  April 11, 2024 14:04 EDT    Dictated Utilizing Dragon Dictation: Part of this note may be an electronic transcription/translation of spoken language to printed text using the Dragon Dictation System.

## 2024-05-02 ENCOUNTER — HOSPITAL ENCOUNTER (OUTPATIENT)
Dept: CARDIOLOGY | Facility: HOSPITAL | Age: 69
Discharge: HOME | End: 2024-05-02
Payer: MEDICARE

## 2024-05-02 VITALS — HEIGHT: 62 IN | BODY MASS INDEX: 40.37 KG/M2 | WEIGHT: 219.36 LBS

## 2024-05-02 DIAGNOSIS — R07.9 CHEST PAIN, UNSPECIFIED TYPE: ICD-10-CM

## 2024-05-02 DIAGNOSIS — R00.2 PALPITATIONS: ICD-10-CM

## 2024-05-02 DIAGNOSIS — Z98.890 HISTORY OF CARDIAC RADIOFREQUENCY ABLATION: ICD-10-CM

## 2024-05-02 DIAGNOSIS — R53.83 OTHER FATIGUE: ICD-10-CM

## 2024-05-02 DIAGNOSIS — I48.0 PAF (PAROXYSMAL ATRIAL FIBRILLATION): ICD-10-CM

## 2024-05-02 LAB
AORTIC DIMENSIONLESS INDEX: 1 (DI)
BH CV ECHO MEAS - ACS: 1.8 CM
BH CV ECHO MEAS - AO MAX PG: 4.2 MMHG
BH CV ECHO MEAS - AO MEAN PG: 2.19 MMHG
BH CV ECHO MEAS - AO ROOT DIAM: 3 CM
BH CV ECHO MEAS - AO V2 MAX: 102.4 CM/SEC
BH CV ECHO MEAS - AO V2 VTI: 21.1 CM
BH CV ECHO MEAS - EDV(CUBED): 82.3 ML
BH CV ECHO MEAS - EF(MOD-BP): 57 %
BH CV ECHO MEAS - EF_3D-VOL: 60 %
BH CV ECHO MEAS - ESV(CUBED): 28.6 ML
BH CV ECHO MEAS - FS: 29.7 %
BH CV ECHO MEAS - IVS/LVPW: 1.35 CM
BH CV ECHO MEAS - IVSD: 1.19 CM
BH CV ECHO MEAS - LA DIMENSION: 3.6 CM
BH CV ECHO MEAS - LAT PEAK E' VEL: 12.1 CM/SEC
BH CV ECHO MEAS - LV MASS(C)D: 152.1 GRAMS
BH CV ECHO MEAS - LV MAX PG: 4.4 MMHG
BH CV ECHO MEAS - LV MEAN PG: 1.71 MMHG
BH CV ECHO MEAS - LV V1 MAX: 105.2 CM/SEC
BH CV ECHO MEAS - LV V1 VTI: 22.2 CM
BH CV ECHO MEAS - LVIDD: 4.4 CM
BH CV ECHO MEAS - LVIDS: 3.1 CM
BH CV ECHO MEAS - LVPWD: 0.88 CM
BH CV ECHO MEAS - MED PEAK E' VEL: 6.6 CM/SEC
BH CV ECHO MEAS - MV A MAX VEL: 63.7 CM/SEC
BH CV ECHO MEAS - MV DEC SLOPE: 288.1 CM/SEC2
BH CV ECHO MEAS - MV DEC TIME: 0.29 SEC
BH CV ECHO MEAS - MV E MAX VEL: 52.1 CM/SEC
BH CV ECHO MEAS - MV E/A: 0.82
BH CV ECHO MEAS - MV MAX PG: 2.45 MMHG
BH CV ECHO MEAS - MV MEAN PG: 0.86 MMHG
BH CV ECHO MEAS - MV P1/2T: 71.7 MSEC
BH CV ECHO MEAS - MV V2 VTI: 23.3 CM
BH CV ECHO MEAS - MVA(P1/2T): 3.1 CM2
BH CV ECHO MEAS - PA V2 MAX: 78.2 CM/SEC
BH CV ECHO MEAS - RAP SYSTOLE: 8 MMHG
BH CV ECHO MEAS - RV MAX PG: 1.04 MMHG
BH CV ECHO MEAS - RV V1 MAX: 50.9 CM/SEC
BH CV ECHO MEAS - RV V1 VTI: 11.5 CM
BH CV ECHO MEAS - RVDD: 3.1 CM
BH CV ECHO MEAS - RVSP: 35.6 MMHG
BH CV ECHO MEAS - TAPSE (>1.6): 2.7 CM
BH CV ECHO MEAS - TR MAX PG: 27.6 MMHG
BH CV ECHO MEAS - TR MAX VEL: 262.8 CM/SEC
BH CV ECHO MEASUREMENTS AVERAGE E/E' RATIO: 5.57
BH CV REST NUCLEAR ISOTOPE DOSE: 10 MCI
BH CV STRESS NUCLEAR ISOTOPE DOSE: 30 MCI
BH CV STRESS RECOVERY BP: NORMAL MMHG
BH CV STRESS RECOVERY HR: 74 BPM
BH CV XLRA - TDI S': 9.8 CM/SEC
LV EF NUC BP: 68 %
MAXIMAL PREDICTED HEART RATE: 150 BPM
PERCENT MAX PREDICTED HR: 84.67 %
SINUS: 3.1 CM
STRESS BASELINE BP: NORMAL MMHG
STRESS BASELINE HR: 60 BPM
STRESS PERCENT HR: 100 %
STRESS POST ESTIMATED WORKLOAD: 5 METS
STRESS POST EXERCISE DUR MIN: 4 MIN
STRESS POST EXERCISE DUR SEC: 5 SEC
STRESS POST PEAK BP: NORMAL MMHG
STRESS POST PEAK HR: 127 BPM
STRESS TARGET HR: 128 BPM

## 2024-05-02 PROCEDURE — 0 TECHNETIUM SESTAMIBI: Performed by: INTERNAL MEDICINE

## 2024-05-02 PROCEDURE — 78452 HT MUSCLE IMAGE SPECT MULT: CPT

## 2024-05-02 PROCEDURE — 93306 TTE W/DOPPLER COMPLETE: CPT

## 2024-05-02 PROCEDURE — A9500 TC99M SESTAMIBI: HCPCS | Performed by: INTERNAL MEDICINE

## 2024-05-02 PROCEDURE — 93017 CV STRESS TEST TRACING ONLY: CPT

## 2024-05-02 RX ADMIN — TECHNETIUM TC 99M SESTAMIBI 1 DOSE: 1 INJECTION INTRAVENOUS at 13:35

## 2024-05-02 RX ADMIN — TECHNETIUM TC 99M SESTAMIBI 1 DOSE: 1 INJECTION INTRAVENOUS at 12:26

## 2024-08-09 ENCOUNTER — TELEPHONE (OUTPATIENT)
Dept: CARDIOLOGY | Facility: CLINIC | Age: 69
End: 2024-08-09
Payer: MEDICARE

## 2024-08-09 NOTE — TELEPHONE ENCOUNTER
Patient states that she is going to be having a procedure and needs to know how long to hold blood thinner. I attempted to call her back to find out the name of doctor doing procedure so that I can send them our cardiac clearance request form.

## 2024-08-12 NOTE — TELEPHONE ENCOUNTER
Patient returned my call today. She states that she has been holding Eliquis since Thursday, procedure to have 8 teeth pulled is tomorrow (08/13/24). She was made aware since she  has already been holding it and it is tomorrow, then she does not need clearance to hold since she has held on her own. She was made aware that holding it does put her at increased risk of stroke and she was made aware that patient typically go back on medication the day after procedure, but was made aware to check with dentist tomorrow.     She was made aware that in the future, we do need cardiac clearance request form filled out from doctors office of who is doing procedure.

## 2024-10-17 ENCOUNTER — OFFICE VISIT (OUTPATIENT)
Dept: CARDIOLOGY | Facility: CLINIC | Age: 69
End: 2024-10-17
Payer: MEDICARE

## 2024-10-17 VITALS
DIASTOLIC BLOOD PRESSURE: 55 MMHG | HEIGHT: 62 IN | BODY MASS INDEX: 39.2 KG/M2 | HEART RATE: 66 BPM | WEIGHT: 213 LBS | SYSTOLIC BLOOD PRESSURE: 87 MMHG

## 2024-10-17 DIAGNOSIS — Z98.890 HISTORY OF CARDIAC RADIOFREQUENCY ABLATION: ICD-10-CM

## 2024-10-17 DIAGNOSIS — I10 ESSENTIAL HYPERTENSION: ICD-10-CM

## 2024-10-17 DIAGNOSIS — Z79.899 LONG TERM CURRENT USE OF ANTIARRHYTHMIC DRUG: ICD-10-CM

## 2024-10-17 DIAGNOSIS — R00.2 PALPITATIONS: ICD-10-CM

## 2024-10-17 DIAGNOSIS — G47.19 EXCESSIVE DAYTIME SLEEPINESS: ICD-10-CM

## 2024-10-17 DIAGNOSIS — R06.83 SNORING: ICD-10-CM

## 2024-10-17 DIAGNOSIS — I48.0 PAF (PAROXYSMAL ATRIAL FIBRILLATION): Primary | ICD-10-CM

## 2024-10-17 DIAGNOSIS — E78.00 HYPERCHOLESTEREMIA: ICD-10-CM

## 2024-10-17 DIAGNOSIS — R53.83 FATIGUE, UNSPECIFIED TYPE: ICD-10-CM

## 2024-10-17 DIAGNOSIS — R42 DIZZINESS: ICD-10-CM

## 2024-10-17 RX ORDER — LISINOPRIL 5 MG/1
5 TABLET ORAL DAILY
COMMUNITY
End: 2024-10-17

## 2024-10-17 RX ORDER — FLECAINIDE ACETATE 50 MG/1
50 TABLET ORAL 2 TIMES DAILY
Qty: 180 TABLET | Refills: 3 | Status: SHIPPED | OUTPATIENT
Start: 2024-10-17

## 2024-10-17 RX ORDER — DILTIAZEM HYDROCHLORIDE 240 MG/1
240 CAPSULE, COATED, EXTENDED RELEASE ORAL DAILY
Qty: 90 CAPSULE | Refills: 3 | Status: SHIPPED | OUTPATIENT
Start: 2024-10-17

## 2024-10-17 NOTE — PROGRESS NOTES
Chief Complaint   Patient presents with    Follow-up     Cardiac management    Lab     Has copy of most recent labs.     Palpitations     Having increased fluttering for the last couple weeks.    Shortness of Breath     Having increased shortness of breath with minimal exertion. Is gasping for breath with any activity.     Dizziness     Has worsened over the last few months. At times she feels she is going to collapse.     Edema     Having swelling in ankles and legs. She has had pain in right lower lower leg, if walks any distance has pain in leg. Started noticing pain a few days ago.    Med Refill     Needs refills on cardiac medications including Nitro sl-90 day        HPI:  HPI   Dunia Eduardo is a 68 y.o. female with HTN, PAF and cardioversion in past. She also had atrial flutter and underwent ablation. Cardiac work-up 2022 showed increased blood pressure and heart rate response and breast attenuation. Cardizem dose increased. Echocardiogram showed mild LVH with a EF around 60%, mild to moderate TR and RVSP of 40 mmHg. Overnight oxygen monitor showed mild nocturnal oxygen desaturation. She also has history of cerebral aneurysm followed by angiograms and due to no change for numerous years was advised to return on a as needed basis.  On 11/6/2023 cardiac monitor worn 27 days showed rare PACs, PVCs and 2 short runs SVT.  No significant pauses were noted.  Patient had dizziness with sinus tachycardia.  The dose of Cardizem was increased.     Today she returns the office for follow-up visit.  She had an echocardiogram and stress test 5/2024 that showed mild hypertensive blood pressure response and otherwise stable stress, echo showed Ejection fraction 56 to 60% and was stable.  She is reporting fairly frequent episodes of dizziness, palpitations and heart rate going above 150 bpm.  At this time she also gets short of breath and has chest pain.  She reports fatigue and daytime sleepiness.  Her daughter is  accompanying her today and states that she snores and stops breathing frequently and has the daughter's entire life.  Today her blood pressure is low and she says she does not monitor it at home.    Cardiac History:    Past Surgical History:   Procedure Laterality Date    ABLATION OF DYSRHYTHMIC FOCUS      ARTERIAL ANEURYSM REPAIR      CARDIAC ELECTROPHYSIOLOGY PROCEDURE N/A 11/15/2018    Ablation atrial flutter;  Surgeon: Phani Kidd MD;    CARDIOVASCULAR STRESS TEST  09/24/2012    5 Min, 85% THR. BP-178/90. Pt had CP. Negative     CARDIOVASCULAR STRESS TEST  08/01/2018    (Mod) 9 Min. 4.6 METS. 76% THR. BP- 197/97. Negative.    CARDIOVASCULAR STRESS TEST  04/17/2019    (Mod) 9 Min. 4.6 METS. 77% THR. BP- 156/80. EF 68%. Negative.    CARDIOVASCULAR STRESS TEST  07/06/2022    3 Min.51 Sec.4.6 METS. 87% THR. 160/97. EF 69%. Breast Attenuation    CARDIOVASCULAR STRESS TEST  05/02/2024    4.05 Min.5 METS. 84% THR. 177/78. EF 68%. Breast attenuation    CARDIOVERSION  01/23/2015    Converted     CARDIOVERSION  10/10/2018    Converted to Sinus    CHOLECYSTECTOMY      ECHO - CONVERTED  08/16/2012    EF 65%. RVSP-41mmHg. R/O PFO    ECHO - CONVERTED  09/24/2012    small PFO    ECHO - CONVERTED  01/16/2015    EF 65%    ECHO - CONVERTED  08/01/2018    EF 65%. RVSP- 40 mmHg.    ECHO - CONVERTED  04/17/2019    EF 65%. Mild MR. Mod TR. RVSP- 38 mmHg.    ECHO - CONVERTED  07/06/2022    EF 60%. Trace-Mild MR. RVSP- 40 mmHg    ECHO - CONVERTED  05/02/2024    EF 60%. Trace-Mild MR. RVSP- 36 mmHg    HYSTERECTOMY      OTHER SURGICAL HISTORY  12/2011    cerebral angiogram, [facility]    OTHER SURGICAL HISTORY  2012    Cardionet-PAF    OTHER SURGICAL HISTORY  11/02/2012    small PFO    OTHER SURGICAL HISTORY  11/06/2023    MCOT- > 27 Days. Avg 68. .Two SVT.       Current Outpatient Medications   Medication Sig Dispense Refill    apixaban (Eliquis) 5 MG tablet tablet One tablet twice a day 60 tablet 11    dilTIAZem CD  (CARDIZEM CD) 240 MG 24 hr capsule Take 1 capsule by mouth Daily. 90 capsule 3    flecainide (TAMBOCOR) 50 MG tablet Take 1 tablet by mouth 2 (Two) Times a Day. 180 tablet 3    levothyroxine (SYNTHROID, LEVOTHROID) 50 MCG tablet Take 1 tablet by mouth Every Morning.      nitroglycerin (NITROSTAT) 0.4 MG SL tablet 1 under the tongue as needed for angina, may repeat q5mins for up three doses 25 tablet 1    sertraline (ZOLOFT) 25 MG tablet Take 1 tablet by mouth Every Morning.       No current facility-administered medications for this visit.       Codeine, Morphine and codeine, Pradaxa [dabigatran etexilate mesylate], and Doxycycline    Past Medical History:   Diagnosis Date    Aneurysm     brain, ruptured with clipping, 8years ago    Aneurysm     2 small cerebral, followed by Dr Gonzalez Lamb    Atrial flutter     Gallbladder removed     GERD (gastroesophageal reflux disease)     Heart murmur     Hx of cholecystectomy     Hx of hysterectomy     Hypercalcemia     Hyperlipidemia     Hypertension     Hypothyroidism     PAF (paroxysmal atrial fibrillation)     PONV (postoperative nausea and vomiting)     Wears glasses     Wears partial dentures        Social History     Socioeconomic History    Marital status:    Tobacco Use    Smoking status: Former     Current packs/day: 0.00     Average packs/day: 1 pack/day for 10.0 years (10.0 ttl pk-yrs)     Types: Cigarettes     Start date: 1998     Quit date: 2008     Years since quittin.1    Smokeless tobacco: Never   Vaping Use    Vaping status: Never Used   Substance and Sexual Activity    Alcohol use: No    Drug use: No    Sexual activity: Defer       Family History   Problem Relation Age of Onset    Heart failure Mother     Heart failure Father     Hypertension Brother     Heart attack Other     Heart disease Other     Heart failure Other     Diabetes Other     Hypertension Other     Clotting disorder Other     Heart attack Sister        Vitals:  "  BP (!) 87/55 (BP Location: Right arm, Patient Position: Sitting)   Pulse 66   Ht 157.5 cm (62.01\")   Wt 96.6 kg (213 lb)   BMI 38.95 kg/m²     Physical Exam  Vitals and nursing note reviewed.   Constitutional:       Appearance: She is obese.   Neck:      Vascular: No carotid bruit.   Cardiovascular:      Rate and Rhythm: Normal rate and regular rhythm.      Pulses: Normal pulses.      Heart sounds: Normal heart sounds. No murmur heard.     No friction rub. No gallop.   Pulmonary:      Effort: Pulmonary effort is normal.      Breath sounds: Normal breath sounds and air entry.   Musculoskeletal:      Right lower leg: No edema.      Left lower leg: No edema.   Skin:     General: Skin is warm and dry.      Capillary Refill: Capillary refill takes less than 2 seconds.   Neurological:      Mental Status: She is alert and oriented to person, place, and time.         ECG 12 Lead    Date/Time: 10/17/2024 4:08 PM  Performed by: Alida Hart APRN    Authorized by: Alida Hart APRN  Comparison: compared with previous ECG from 4/11/2024  Similar to previous ECG  Rhythm: sinus rhythm  Rate: normal  BPM: 66  Conduction: incomplete right bundle branch block    Clinical impression: non-specific ECG  Comments: QTc 412 ms           Assessment & Plan     Diagnoses and all orders for this visit:    1. PAF (paroxysmal atrial fibrillation) (Primary)  -     apixaban (Eliquis) 5 MG tablet tablet; One tablet twice a day  Dispense: 60 tablet; Refill: 11  -     flecainide (TAMBOCOR) 50 MG tablet; Take 1 tablet by mouth 2 (Two) Times a Day.  Dispense: 180 tablet; Refill: 3  -     Cancel: Cardiac Event Monitor ([facility]) or Mobile Cardiac Outpatient Telemetry ([facility])  -     ECG 12 Lead    2. Essential hypertension    3. History of cardiac radiofrequency ablation    4. Long term current use of antiarrhythmic drug  -     ECG 12 Lead    5. Hypercholesteremia    6. Fatigue, unspecified type  -     Overnight Sleep Oximetry Study; Future    7. " Snoring  -     Overnight Sleep Oximetry Study; Future    Other orders  -     dilTIAZem CD (CARDIZEM CD) 240 MG 24 hr capsule; Take 1 capsule by mouth Daily.  Dispense: 90 capsule; Refill: 3    PAF/history of ablation/antiarrhythmic drug  - EKG today shows normal sinus rhythm with incomplete RBBB and a rate of 66 bpm normal QTc and [place] intervals.  - Continue flecainide, Eliquis    Hypercholesteremia  - Labs managed with PCP  - Continue lifestyle management    Fatigue/snoring/excessive daytime sleepiness  - Overnight sleep oximetry ordered  - Consider referral to [facility] for sleep study evaluation    Hold lisinopril. MCOT 14 days, overnight oxymetry with possible referral to [facility] depending on results.  Monitor blood pressure at home and report if SBP is 140 or greater.  Refill sent to pharmacy    Visit Diagnoses:    ICD-10-CM ICD-9-CM   1. PAF (paroxysmal atrial fibrillation)  I48.0 427.31   2. Essential hypertension  I10 401.9   3. History of cardiac radiofrequency ablation  Z98.890 V15.1   4. Long term current use of antiarrhythmic drug  Z79.899 V58.69   5. Hypercholesteremia  E78.00 272.0   6. Fatigue, unspecified type  R53.83 780.79   7. Snoring  R06.83 786.09       Meds Ordered During Visit:  New Medications Ordered This Visit   Medications    apixaban (Eliquis) 5 MG tablet tablet     Sig: One tablet twice a day     Dispense:  60 tablet     Refill:  11    dilTIAZem CD (CARDIZEM CD) 240 MG 24 hr capsule     Sig: Take 1 capsule by mouth Daily.     Dispense:  90 capsule     Refill:  3    flecainide (TAMBOCOR) 50 MG tablet     Sig: Take 1 tablet by mouth 2 (Two) Times a Day.     Dispense:  180 tablet     Refill:  3       Follow Up Appointment:   Return in about 6 months (around 4/17/2025), or if symptoms worsen or fail to improve.           This document has been electronically signed by ESTEBAN Palma  October 17, 2024 16:12 EDT    Dictated Utilizing Dragon Dictation: Part of this note  may be an electronic transcription/translation of spoken language to printed text using the Dragon Dictation System.

## 2024-10-18 ENCOUNTER — TELEPHONE (OUTPATIENT)
Dept: CARDIOLOGY | Facility: CLINIC | Age: 69
End: 2024-10-18
Payer: MEDICARE

## 2024-10-18 NOTE — TELEPHONE ENCOUNTER
Caller: Dunia Eduardo    Relationship: Self    Best call back number: [phone]    What is the best time to reach you: ANY    Who are you requesting to speak with (clinical staff, provider,  specific staff member): CLINICAL    What was the call regarding: PATIENT CALLED TO SAY HER ELIQUIS HAS TRIPLED IN [place] AND SHE WANT TO SEE ABOUT GETTING IT CHANGED TO A CHEAPER SUBSTITUTE. PLEASE CALL HER BACK TO DISCUSS FURTHER. THANK YOU    Is it okay if the provider responds through [facility]: PLEASE CALL

## 2024-11-20 DIAGNOSIS — I48.0 PAF (PAROXYSMAL ATRIAL FIBRILLATION): ICD-10-CM

## 2024-11-20 RX ORDER — FLECAINIDE ACETATE 100 MG/1
50 TABLET ORAL 2 TIMES DAILY
Qty: 180 TABLET | Refills: 3 | Status: SHIPPED | OUTPATIENT
Start: 2024-11-20 | End: 2024-11-21

## 2024-11-21 ENCOUNTER — OFFICE VISIT (OUTPATIENT)
Dept: PULMONOLOGY | Facility: CLINIC | Age: 69
End: 2024-11-21
Payer: MEDICARE

## 2024-11-21 ENCOUNTER — PATIENT ROUNDING (BHMG ONLY) (OUTPATIENT)
Dept: PULMONOLOGY | Facility: CLINIC | Age: 69
End: 2024-11-21
Payer: MEDICARE

## 2024-11-21 VITALS
DIASTOLIC BLOOD PRESSURE: 78 MMHG | WEIGHT: 213 LBS | HEART RATE: 64 BPM | HEIGHT: 63 IN | BODY MASS INDEX: 37.74 KG/M2 | OXYGEN SATURATION: 95 % | SYSTOLIC BLOOD PRESSURE: 152 MMHG

## 2024-11-21 DIAGNOSIS — R06.83 SNORING: ICD-10-CM

## 2024-11-21 DIAGNOSIS — G47.19 DAYTIME HYPERSOMNOLENCE: Primary | ICD-10-CM

## 2024-11-21 DIAGNOSIS — I48.0 PAF (PAROXYSMAL ATRIAL FIBRILLATION): ICD-10-CM

## 2024-11-21 RX ORDER — LISINOPRIL 20 MG/1
5 TABLET ORAL DAILY
COMMUNITY

## 2024-11-21 RX ORDER — CALCIUM CARBONATE 750 MG/1
TABLET, CHEWABLE ORAL EVERY 12 HOURS SCHEDULED
COMMUNITY

## 2024-11-21 RX ORDER — FLECAINIDE ACETATE 100 MG/1
100 TABLET ORAL 2 TIMES DAILY
Qty: 180 TABLET | Refills: 3 | Status: SHIPPED | OUTPATIENT
Start: 2024-11-21

## 2024-11-21 NOTE — PROGRESS NOTES
New Pulmonary Patient Office Visit      Patient Name: Dunia Eduardo    Referring Physician: ESTEBAN Palma    Chief Complaint:  Fatigue    History of Present Illness: Dunia Eduardo is a 68 y.o. female who is referred here today by cardiology to establish care with Pulmonary for suspected sleep apnea due to daytime fatigue and snoring. She did have a recent overnight pulse oximetry study which showed O2 saturation less than or equal to 88% for 1 minute 4 seconds.  She notes that she is feeling some better in terms of energy since her lisinopril was stopped. No known lung disease.    Supplemental Oxygen: No    Subjective      Review of Systems:   Review of Systems   Constitutional:  Positive for fatigue. Negative for fever and unexpected weight change.   Cardiovascular:  Positive for leg swelling. Negative for chest pain.        Past Medical History:   Past Medical History:   Diagnosis Date    Aneurysm     brain, ruptured with clipping, 8years ago    Aneurysm     2 small cerebral, followed by Dr Gonzalez Lamb    Atrial flutter     Gallbladder removed     GERD (gastroesophageal reflux disease)     Heart murmur     Hx of cholecystectomy     Hx of hysterectomy     Hypercalcemia     Hyperlipidemia     Hypertension     Hypothyroidism     PAF (paroxysmal atrial fibrillation)     PONV (postoperative nausea and vomiting)     Wears glasses     Wears partial dentures        Past Surgical History:   Past Surgical History:   Procedure Laterality Date    ABLATION OF DYSRHYTHMIC FOCUS      ARTERIAL ANEURYSM REPAIR      CARDIAC ELECTROPHYSIOLOGY PROCEDURE N/A 11/15/2018    Ablation atrial flutter;  Surgeon: Phani Kidd MD;    CARDIOVASCULAR STRESS TEST  09/24/2012    5 Min, 85% THR. BP-178/90. Pt had CP. Negative     CARDIOVASCULAR STRESS TEST  08/01/2018    (Mod) 9 Min. 4.6 METS. 76% THR. BP- 197/97. Negative.    CARDIOVASCULAR STRESS TEST  04/17/2019    (Mod) 9 Min. 4.6 METS. 77% THR. BP- 156/80. EF 68%.  Negative.    CARDIOVASCULAR STRESS TEST  2022    3 Min.51 Sec.4.6 METS. 87% THR. 160/97. EF 69%. Breast Attenuation    CARDIOVASCULAR STRESS TEST  2024    4.05 Min.5 METS. 84% THR. 177/78. EF 68%. Breast attenuation    CARDIOVERSION  2015    Converted     CARDIOVERSION  10/10/2018    Converted to Sinus    CHOLECYSTECTOMY      CONVERTED (HISTORICAL) HOLTER  2024    > 13 Days. AVG 68. . 1.1% PVC. 4 VT. 57 SVT. PAF    ECHO - CONVERTED  2012    EF 65%. RVSP-41mmHg. R/O PFO    ECHO - CONVERTED  2012    small PFO    ECHO - CONVERTED  2015    EF 65%    ECHO - CONVERTED  2018    EF 65%. RVSP- 40 mmHg.    ECHO - CONVERTED  2019    EF 65%. Mild MR. Mod TR. RVSP- 38 mmHg.    ECHO - CONVERTED  2022    EF 60%. Trace-Mild MR. RVSP- 40 mmHg    ECHO - CONVERTED  2024    EF 60%. Trace-Mild MR. RVSP- 36 mmHg    HYSTERECTOMY      OTHER SURGICAL HISTORY  2011    cerebral angiogram, [facility] [place]    OTHER SURGICAL HISTORY      Cardionet-PAF    OTHER SURGICAL HISTORY  2012    small PFO    OTHER SURGICAL HISTORY  2023    MCOT- > 27 Days. Avg 68. .Two SVT.       Family History:   Family History   Problem Relation Age of Onset    Heart failure Mother     Heart failure Father     Hypertension Brother     Heart attack Other     Heart disease Other     Heart failure Other     Diabetes Other     Hypertension Other     Clotting disorder Other     Heart attack Sister        Social History:   Social History     Socioeconomic History    Marital status:    Tobacco Use    Smoking status: Former     Current packs/day: 0.00     Average packs/day: 1 pack/day for 10.0 years (10.0 ttl pk-yrs)     Types: Cigarettes     Start date: 1998     Quit date: 2008     Years since quittin.1     Passive exposure: Past    Smokeless tobacco: Never   Vaping Use    Vaping status: Never Used   Substance and Sexual Activity    Alcohol use: No    Drug  "use: No    Sexual activity: Defer       Medications:     Current Outpatient Medications:     apixaban (Eliquis) 5 MG tablet tablet, One tablet twice a day, Disp: 60 tablet, Rfl: 11    calcium carbonate EX (Tums Smoothies) 750 MG chewable tablet, Every 12 (Twelve) Hours., Disp: , Rfl:     dilTIAZem CD (CARDIZEM CD) 240 MG 24 hr capsule, Take 1 capsule by mouth Daily., Disp: 90 capsule, Rfl: 3    flecainide (TAMBOCOR) 100 MG tablet, Take 1 tablet by mouth 2 (Two) Times a Day., Disp: 180 tablet, Rfl: 3    levothyroxine (SYNTHROID, LEVOTHROID) 50 MCG tablet, Take 1 tablet by mouth Every Morning., Disp: , Rfl:     nitroglycerin (NITROSTAT) 0.4 MG SL tablet, 1 under the tongue as needed for angina, may repeat q5mins for up three doses, Disp: 25 tablet, Rfl: 1    sertraline (ZOLOFT) 25 MG tablet, Take 1 tablet by mouth Every Morning., Disp: , Rfl:     lisinopril (PRINIVIL,ZESTRIL) 20 MG tablet, Take 5 mg by mouth Daily. (Patient not taking: Reported on 11/21/2024), Disp: , Rfl:     Allergies:   Allergies   Allergen Reactions    Codeine Shortness Of Breath    Morphine And Codeine Other (See Comments)     ANGINA    Pradaxa [Dabigatran Etexilate Mesylate] Other (See Comments)     \"hypothermia\", bad heartburn    Doxycycline Rash       Objective     Physical Exam:  Vital Signs:   Vitals:    11/21/24 0907   BP: 152/78  Comment: RECHECK-148/76   Pulse: 64   SpO2: 95%   Weight: 96.6 kg (213 lb)   Height: 160 cm (63\")     Body mass index is 37.73 kg/m².    Physical Exam  Vitals reviewed.   Constitutional:       General: She is not in acute distress.     Appearance: She is not toxic-appearing.   HENT:      Head: Normocephalic and atraumatic.      Mouth/Throat:      Mouth: Mucous membranes are moist.   Eyes:      Extraocular Movements: Extraocular movements intact.      Conjunctiva/sclera: Conjunctivae normal.   Cardiovascular:      Rate and Rhythm: Normal rate.      Heart sounds: Normal heart sounds.   Pulmonary:      Effort: " Pulmonary effort is normal.      Breath sounds: Normal breath sounds.   Abdominal:      General: There is no distension.      Palpations: Abdomen is soft.   Musculoskeletal:         General: No deformity.      Cervical back: Neck supple.   Skin:     General: Skin is warm and dry.      Findings: No rash.   Neurological:      General: No focal deficit present.      Mental Status: She is alert and oriented to person, place, and time.   Psychiatric:         Mood and Affect: Mood normal.         Behavior: Behavior normal.       Results Review:   Results for orders placed during the hospital encounter of 05/02/24    Adult Transthoracic Echo Complete W/ Cont if Necessary Per Protocol    Interpretation Summary    Left ventricular wall thickness is consistent with mild concentric hypertrophy.    Left ventricular ejection fraction appears to be 56 - 60%.    Left ventricular diastolic function is consistent with (grade I) impaired relaxation and age.    The aortic valve is structurally normal with no regurgitation or stenosis present.    Trace to mild mitral valve regurgitation is present.    Mild tricuspid valve regurgitation is present.  RVSP - 36 mmHg    Assessment / Plan      Assessment/Plan:    Diagnoses and all orders for this visit:    1. Daytime hypersomnolence (Primary)  -     Home Sleep Study; Future    2. Snoring  -     Home Sleep Study; Future    Patient with multiple symptoms suggestive of sleep apnea. Will check sleep study to evaluate. She is willing to use CPAP if needed. The patient was counseled on the risks of untreated sleep apnea and voiced understanding.     Follow Up:   Return in about 4 months (around 3/21/2025) for Recheck.  The patient was counseled on diagnostic results, risks and benefits of treatment options, risk factor modifications and the importance of treatment compliance. The patient was advised to contact the clinic with concerns or worsening symptoms.     ESTEBAN [facility]  Pulmonary  [facility] [place]    This document has been electronically signed by Sarah Beth Sanchez, ESTEBAN  November 21, 2024

## 2024-11-22 ENCOUNTER — CLINICAL SUPPORT (OUTPATIENT)
Dept: CARDIOLOGY | Facility: CLINIC | Age: 69
End: 2024-11-22
Payer: MEDICARE

## 2024-11-22 ENCOUNTER — TELEPHONE (OUTPATIENT)
Dept: CARDIOLOGY | Facility: CLINIC | Age: 69
End: 2024-11-22

## 2024-11-22 DIAGNOSIS — I48.0 PAF (PAROXYSMAL ATRIAL FIBRILLATION): Primary | ICD-10-CM

## 2024-11-22 PROCEDURE — 93000 ELECTROCARDIOGRAM COMPLETE: CPT | Performed by: INTERNAL MEDICINE

## 2024-11-22 NOTE — TELEPHONE ENCOUNTER
Patient aware of EKG results, aware to continue Flecainide 100 mg bid, eliquis 5mg bid and diltiazem cd 240 mg daily per Dr Avina.     Concerning blood pressure, Dr Avina advised to restart lisinopril 5 mg 1/2 tablet daily. Patient voiced understanding.

## 2024-11-22 NOTE — PROGRESS NOTES
Procedure     ECG 12 Lead    Date/Time: 11/22/2024 11:39 AM  Performed by: Karely Avina MD    Authorized by: Karely Avina MD  Comparison: compared with previous ECG from 10/17/2024  Similar to previous ECG  Rhythm: sinus rhythm  Rate: normal  QRS axis: left  Other findings: left ventricular hypertrophy    Clinical impression: abnormal EKG

## 2025-01-10 DIAGNOSIS — R06.83 SNORING: ICD-10-CM

## 2025-01-10 DIAGNOSIS — G47.19 DAYTIME HYPERSOMNOLENCE: ICD-10-CM

## 2025-01-13 DIAGNOSIS — G47.33 OSA (OBSTRUCTIVE SLEEP APNEA): Primary | ICD-10-CM

## 2025-03-20 ENCOUNTER — OFFICE VISIT (OUTPATIENT)
Dept: PULMONOLOGY | Facility: CLINIC | Age: 70
End: 2025-03-20
Payer: MEDICARE

## 2025-03-20 VITALS
HEART RATE: 60 BPM | BODY MASS INDEX: 38.8 KG/M2 | SYSTOLIC BLOOD PRESSURE: 171 MMHG | DIASTOLIC BLOOD PRESSURE: 87 MMHG | HEIGHT: 63 IN | WEIGHT: 219 LBS | OXYGEN SATURATION: 100 %

## 2025-03-20 DIAGNOSIS — R09.81 NASAL CONGESTION: ICD-10-CM

## 2025-03-20 DIAGNOSIS — G47.33 OSA (OBSTRUCTIVE SLEEP APNEA): Primary | ICD-10-CM

## 2025-03-20 PROCEDURE — 99213 OFFICE O/P EST LOW 20 MIN: CPT | Performed by: NURSE PRACTITIONER

## 2025-03-20 PROCEDURE — 3077F SYST BP >= 140 MM HG: CPT | Performed by: NURSE PRACTITIONER

## 2025-03-20 PROCEDURE — 3079F DIAST BP 80-89 MM HG: CPT | Performed by: NURSE PRACTITIONER

## 2025-03-20 RX ORDER — FLUTICASONE PROPIONATE 50 MCG
1 SPRAY, SUSPENSION (ML) NASAL DAILY PRN
Qty: 1 G | Refills: 5 | Status: SHIPPED | OUTPATIENT
Start: 2025-03-20

## 2025-03-20 NOTE — PROGRESS NOTES
Follow Up Office Visit      Patient Name: Dunia Eduardo    Chief Complaint:    Chief Complaint   Patient presents with    Sleeping Problem       History of Present Illness: Dunia Eduardo is a 69 y.o. female who is here today for follow up of sleep last visit, home sleep study returned positive for mild sleep apnea and she has been started on AutoPap.  She notes that she overall tolerates PAP use well and is feeling better rested.  She sometimes has nasal congestion/allergic rhinitis which can cause some intolerance with her mask.  She is using a nasal mask.  Her DME company is [employer].    Supplemental Oxygen: No    Subjective      Review of Systems:  Review of Systems   Constitutional:  Negative for fever and unexpected weight change.   HENT:  Positive for congestion.    Respiratory:  Negative for cough, shortness of breath and wheezing.    Cardiovascular:  Negative for chest pain.        Past Medical History:   Past Medical History:   Diagnosis Date    Aneurysm     brain, ruptured with clipping, 8years ago    Aneurysm     2 small cerebral, followed by Dr Gonzalez Lamb    Atrial flutter     Gallbladder removed     GERD (gastroesophageal reflux disease)     Heart murmur     Hx of cholecystectomy     Hx of hysterectomy     Hypercalcemia     Hyperlipidemia     Hypertension     Hypothyroidism     PAF (paroxysmal atrial fibrillation)     PONV (postoperative nausea and vomiting)     Wears glasses     Wears partial dentures        Past Surgical History:   Past Surgical History:   Procedure Laterality Date    ABLATION OF DYSRHYTHMIC FOCUS      ARTERIAL ANEURYSM REPAIR      CARDIAC ELECTROPHYSIOLOGY PROCEDURE N/A 11/15/2018    Ablation atrial flutter;  Surgeon: Phani Kidd MD;    CARDIOVASCULAR STRESS TEST  09/24/2012    5 Min, 85% THR. BP-178/90. Pt had CP. Negative     CARDIOVASCULAR STRESS TEST  08/01/2018    (Mod) 9 Min. 4.6 METS. 76% THR. BP- 197/97. Negative.    CARDIOVASCULAR STRESS TEST   2019    (Mod) 9 Min. 4.6 METS. 77% THR. BP- 156/80. EF 68%. Negative.    CARDIOVASCULAR STRESS TEST  2022    3 Min.51 Sec.4.6 METS. 87% THR. 160/97. EF 69%. Breast Attenuation    CARDIOVASCULAR STRESS TEST  2024    4.05 Min.5 METS. 84% THR. 177/78. EF 68%. Breast attenuation    CARDIOVERSION  2015    Converted     CARDIOVERSION  10/10/2018    Converted to Sinus    CHOLECYSTECTOMY      CONVERTED (HISTORICAL) HOLTER  2024    > 13 Days. AVG 68. . 1.1% PVC. 4 VT. 57 SVT. PAF    ECHO - CONVERTED  2012    EF 65%. RVSP-41mmHg. R/O PFO    ECHO - CONVERTED  2012    small PFO    ECHO - CONVERTED  2015    EF 65%    ECHO - CONVERTED  2018    EF 65%. RVSP- 40 mmHg.    ECHO - CONVERTED  2019    EF 65%. Mild MR. Mod TR. RVSP- 38 mmHg.    ECHO - CONVERTED  2022    EF 60%. Trace-Mild MR. RVSP- 40 mmHg    ECHO - CONVERTED  2024    EF 60%. Trace-Mild MR. RVSP- 36 mmHg    HYSTERECTOMY      OTHER SURGICAL HISTORY  2011    cerebral angiogram, [facility] [place]    OTHER SURGICAL HISTORY      Cardionet-PAF    OTHER SURGICAL HISTORY  2012    small PFO    OTHER SURGICAL HISTORY  2023    MCOT- > 27 Days. Avg 68. .Two SVT.       Family History:   Family History   Problem Relation Age of Onset    Heart failure Mother     Heart failure Father     Hypertension Brother     Heart attack Other     Heart disease Other     Heart failure Other     Diabetes Other     Hypertension Other     Clotting disorder Other     Heart attack Sister        Social History:   Social History     Socioeconomic History    Marital status:    Tobacco Use    Smoking status: Former     Current packs/day: 0.00     Average packs/day: 1 pack/day for 10.0 years (10.0 ttl pk-yrs)     Types: Cigarettes     Start date: 1998     Quit date: 2008     Years since quittin.5     Passive exposure: Past    Smokeless tobacco: Never   Vaping Use    Vaping status: Never  "Used   Substance and Sexual Activity    Alcohol use: No    Drug use: No    Sexual activity: Defer       Current Medications:     Current Outpatient Medications:     apixaban (Eliquis) 5 MG tablet tablet, One tablet twice a day, Disp: 60 tablet, Rfl: 11    calcium carbonate EX (Tums Smoothies) 750 MG chewable tablet, Every 12 (Twelve) Hours., Disp: , Rfl:     dilTIAZem CD (CARDIZEM CD) 240 MG 24 hr capsule, Take 1 capsule by mouth Daily., Disp: 90 capsule, Rfl: 3    flecainide (TAMBOCOR) 100 MG tablet, Take 1 tablet by mouth 2 (Two) Times a Day., Disp: 180 tablet, Rfl: 3    sertraline (ZOLOFT) 25 MG tablet, Take 1 tablet by mouth Every Morning., Disp: , Rfl:     levothyroxine (SYNTHROID, LEVOTHROID) 50 MCG tablet, Take 1 tablet by mouth Every Morning. (Patient not taking: Reported on 3/20/2025), Disp: , Rfl:     lisinopril (PRINIVIL,ZESTRIL) 20 MG tablet, Take 5 mg by mouth Daily. (Patient not taking: Reported on 3/20/2025), Disp: , Rfl:      Allergies:   Allergies   Allergen Reactions    Codeine Shortness Of Breath    Morphine And Codeine Other (See Comments)     ANGINA    Pradaxa [Dabigatran Etexilate Mesylate] Other (See Comments)     \"hypothermia\", bad heartburn    Promethazine Hcl Other (See Comments)    Doxycycline Rash     Objective     Physical Exam:  Vital Signs:   Vitals:    03/20/25 1031   BP: 171/87  Comment: checked twice   Pulse: 60   SpO2: 100%   Weight: 99.3 kg (219 lb)   Height: 160 cm (63\")     Body mass index is 38.79 kg/m².    Physical Exam  Vitals reviewed.   Constitutional:       General: She is not in acute distress.     Appearance: She is not toxic-appearing.   HENT:      Head: Normocephalic and atraumatic.      Mouth/Throat:      Mouth: Mucous membranes are moist.   Eyes:      Extraocular Movements: Extraocular movements intact.      Conjunctiva/sclera: Conjunctivae normal.   Cardiovascular:      Rate and Rhythm: Normal rate.      Heart sounds: Normal heart sounds.   Pulmonary:      Effort: " Pulmonary effort is normal.      Breath sounds: Normal breath sounds.   Abdominal:      General: There is no distension.      Palpations: Abdomen is soft.   Musculoskeletal:         General: No deformity.      Cervical back: Neck supple.   Skin:     General: Skin is warm and dry.      Findings: No rash.   Neurological:      General: No focal deficit present.      Mental Status: She is alert and oriented to person, place, and time.   Psychiatric:         Mood and Affect: Mood normal.         Behavior: Behavior normal.       Results Review:   January 2025 home sleep study showed AHI of 9/h.    February - March 2025 compliance report showed 93% usage days with average of 6 hours 22 minutes with AHI of 6 on AutoPap at 5-16 cmH2O.    Assessment / Plan      Assessment/Plan:   Diagnoses and all orders for this visit:    1. VAL (obstructive sleep apnea) (Primary)  -     BIPAP / CPAP Adjustment; Future  Home sleep study report and compliance report reviewed and discussed with patient. Good clinical response to treatment.  Discussed the importance of continuing nightly PAP compliance.    2. Nasal congestion  -     fluticasone (FLONASE) 50 MCG/ACT nasal spray; Administer 1 spray into the nostril(s) as directed by provider Daily As Needed for Rhinitis or Allergies (Nasal congestion).  Dispense: 1 g; Refill: 5       Follow Up:   Return in about 1 year (around 3/20/2026) for Recheck.  The patient was counseled on diagnostic results, risks and benefits of treatment options, risk factor modifications and the importance of treatment compliance. The patient was advised to contact the clinic with concerns or worsening symptoms.     ESTEBAN Soto   [facility]     This document has been electronically signed by ESTEBAN Soto  March 20, 2025

## 2025-05-07 ENCOUNTER — OFFICE VISIT (OUTPATIENT)
Dept: CARDIOLOGY | Facility: CLINIC | Age: 70
End: 2025-05-07
Payer: MEDICARE

## 2025-05-07 VITALS
WEIGHT: 224.2 LBS | HEART RATE: 54 BPM | BODY MASS INDEX: 39.73 KG/M2 | HEIGHT: 63 IN | DIASTOLIC BLOOD PRESSURE: 60 MMHG | SYSTOLIC BLOOD PRESSURE: 110 MMHG

## 2025-05-07 DIAGNOSIS — I48.0 PAF (PAROXYSMAL ATRIAL FIBRILLATION): Primary | ICD-10-CM

## 2025-05-07 DIAGNOSIS — I10 ESSENTIAL HYPERTENSION: ICD-10-CM

## 2025-05-07 DIAGNOSIS — Z98.890 HISTORY OF CARDIAC RADIOFREQUENCY ABLATION: ICD-10-CM

## 2025-05-07 DIAGNOSIS — E78.00 HYPERCHOLESTEREMIA: ICD-10-CM

## 2025-05-07 DIAGNOSIS — G47.30 SLEEP APNEA, UNSPECIFIED TYPE: ICD-10-CM

## 2025-05-07 DIAGNOSIS — Z79.899 LONG TERM CURRENT USE OF ANTIARRHYTHMIC DRUG: ICD-10-CM

## 2025-05-07 DIAGNOSIS — R07.89 CHEST DISCOMFORT: ICD-10-CM

## 2025-05-07 RX ORDER — FLECAINIDE ACETATE 100 MG/1
100 TABLET ORAL 2 TIMES DAILY
Qty: 180 TABLET | Refills: 3 | Status: SHIPPED | OUTPATIENT
Start: 2025-05-07

## 2025-05-07 RX ORDER — ERGOCALCIFEROL 1.25 MG/1
50000 CAPSULE, LIQUID FILLED ORAL WEEKLY
COMMUNITY

## 2025-05-07 RX ORDER — DILTIAZEM HYDROCHLORIDE 240 MG/1
240 CAPSULE, COATED, EXTENDED RELEASE ORAL DAILY
Qty: 90 CAPSULE | Refills: 3 | Status: SHIPPED | OUTPATIENT
Start: 2025-05-07

## 2025-05-07 NOTE — PROGRESS NOTES
Chief Complaint   Patient presents with    Follow-up     Cardiac management    Sleep apnea     Reports feeling much better since wearing CPAP. Follows with Sarah Beth Sanchez.    Lab     PCP monitors every 3 months.    Chest Pain     Noticed some pain in mid upper chest and heaviness in chest today.    Med Refill     Needs refills on cardiac medications-90 day        HPI:  HPI   Dunia Eduardo is a 69 y.o. female with HTN, PAF and cardioversion in past. She also had atrial flutter and underwent ablation. Cardiac work-up 2022 showed increased blood pressure and heart rate response and breast attenuation.  Echocardiogram showed mild LVH with a EF around 60%, mild to moderate TR and RVSP of 40 mmHg. Overnight oxygen monitor showed mild nocturnal oxygen desaturation. She also has history of cerebral aneurysm followed by angiograms and due to no change for numerous years was advised to return on a as needed basis.  On 11/6/2023 cardiac monitor worn 27 days showed rare PACs, PVCs and 2 short runs SVT.  No significant pauses were noted.  She had an echocardiogram and stress test 5/2024 that showed mild hypertensive blood pressure response and otherwise stable stress, echo showed Ejection fraction 56 to 60% and was stable.       Today she returns the office for follow-up visit.  She wore an event monitor for 13 days 11/2024 showed 57 episodes of SVT and a few episodes of A-fib.  Flecainide dose increased.  She did have an abnormal overnight 10/2024 was referred to pulmonology for sleep apnea evaluation and then prescribed CPAP. Patient denies chest pain, palpitations, tightness, dizziness, shortness of air. She reports having a little chest pressure today for the first time. It started on the ride here. This is isolated. She has been compliant with CPAP and feeling much better. Labs with PCP reported as normal except low vit D, she started supplement.     Cardiac History:    Past Surgical History:   Procedure Laterality Date     ABLATION OF DYSRHYTHMIC FOCUS      ARTERIAL ANEURYSM REPAIR      CARDIAC ELECTROPHYSIOLOGY PROCEDURE N/A 11/15/2018    Ablation atrial flutter;  Surgeon: Phani Kidd MD;    CARDIOVASCULAR STRESS TEST  09/24/2012    5 Min, 85% THR. BP-178/90. Pt had CP. Negative     CARDIOVASCULAR STRESS TEST  08/01/2018    (Mod) 9 Min. 4.6 METS. 76% THR. BP- 197/97. Negative.    CARDIOVASCULAR STRESS TEST  04/17/2019    (Mod) 9 Min. 4.6 METS. 77% THR. BP- 156/80. EF 68%. Negative.    CARDIOVASCULAR STRESS TEST  07/06/2022    3 Min.51 Sec.4.6 METS. 87% THR. 160/97. EF 69%. Breast Attenuation    CARDIOVASCULAR STRESS TEST  05/02/2024    4.05 Min.5 METS. 84% THR. 177/78. EF 68%. Breast attenuation    CARDIOVERSION  01/23/2015    Converted     CARDIOVERSION  10/10/2018    Converted to Sinus    CHOLECYSTECTOMY      CONVERTED (HISTORICAL) HOLTER  11/19/2024    > 13 Days. AVG 68. . 1.1% PVC. 4 VT. 57 SVT. PAF    ECHO - CONVERTED  08/16/2012    EF 65%. RVSP-41mmHg. R/O PFO    ECHO - CONVERTED  09/24/2012    small PFO    ECHO - CONVERTED  01/16/2015    EF 65%    ECHO - CONVERTED  08/01/2018    EF 65%. RVSP- 40 mmHg.    ECHO - CONVERTED  04/17/2019    EF 65%. Mild MR. Mod TR. RVSP- 38 mmHg.    ECHO - CONVERTED  07/06/2022    EF 60%. Trace-Mild MR. RVSP- 40 mmHg    ECHO - CONVERTED  05/02/2024    EF 60%. Trace-Mild MR. RVSP- 36 mmHg    HYSTERECTOMY      OTHER SURGICAL HISTORY  12/2011    cerebral angiogram, [facility] [place]    OTHER SURGICAL HISTORY  2012    Cardionet-PAF    OTHER SURGICAL HISTORY  11/02/2012    small PFO    OTHER SURGICAL HISTORY  11/06/2023    MCOT- > 27 Days. Avg 68. .Two SVT.       Current Outpatient Medications   Medication Sig Dispense Refill    apixaban (Eliquis) 5 MG tablet tablet One tablet twice a day 60 tablet 11    calcium carbonate EX (Tums Smoothies) 750 MG chewable tablet 2 (Two) Times a Day As Needed.      dilTIAZem CD (CARDIZEM CD) 240 MG 24 hr capsule Take 1 capsule by mouth Daily. 90  capsule 3    flecainide (TAMBOCOR) 100 MG tablet Take 1 tablet by mouth 2 (Two) Times a Day. 180 tablet 3    fluticasone (FLONASE) 50 MCG/ACT nasal spray Administer 1 spray into the nostril(s) as directed by provider Daily As Needed for Rhinitis or Allergies (Nasal congestion). 1 g 5    sertraline (ZOLOFT) 25 MG tablet Take 1 tablet by mouth Every Morning.      vitamin D (ERGOCALCIFEROL) 1.25 MG (84209 UT) capsule capsule Take 1 capsule by mouth 1 (One) Time Per Week.       No current facility-administered medications for this visit.       Codeine, Morphine and codeine, Pradaxa [dabigatran etexilate mesylate], Promethazine hcl, and Doxycycline    Past Medical History:   Diagnosis Date    Aneurysm     brain, ruptured with clipping, 8years ago    Aneurysm     2 small cerebral, followed by Dr Gonzalez Lamb    Atrial flutter     Gallbladder removed     GERD (gastroesophageal reflux disease)     Heart murmur     Hx of cholecystectomy     Hx of hysterectomy     Hypercalcemia     Hyperlipidemia     Hypertension     Hypothyroidism     PAF (paroxysmal atrial fibrillation)     PONV (postoperative nausea and vomiting)     Wears glasses     Wears partial dentures        Social History     Socioeconomic History    Marital status:    Tobacco Use    Smoking status: Former     Current packs/day: 0.00     Average packs/day: 1 pack/day for 10.0 years (10.0 ttl pk-yrs)     Types: Cigarettes     Start date: 1998     Quit date: 2008     Years since quittin.6     Passive exposure: Past    Smokeless tobacco: Never   Vaping Use    Vaping status: Never Used   Substance and Sexual Activity    Alcohol use: No    Drug use: No    Sexual activity: Defer       Family History   Problem Relation Age of Onset    Heart failure Mother     Heart failure Father     Hypertension Brother     Heart attack Other     Heart disease Other     Heart failure Other     Diabetes Other     Hypertension Other     Clotting disorder Other      "Heart attack Sister        Vitals:   /60 (BP Location: Right arm, Patient Position: Sitting)   Pulse 54   Ht 160 cm (62.99\")   Wt 102 kg (224 lb 3.2 oz)   BMI 39.73 kg/m²     Physical Exam  Vitals and nursing note reviewed.   Constitutional:       Appearance: She is morbidly obese.   Neck:      Vascular: No carotid bruit.   Cardiovascular:      Rate and Rhythm: Normal rate and regular rhythm.      Pulses: Normal pulses.      Heart sounds: Normal heart sounds. No murmur heard.     No friction rub. No gallop.   Pulmonary:      Effort: Pulmonary effort is normal.      Breath sounds: Normal breath sounds and air entry.   Musculoskeletal:      Right lower leg: No edema.      Left lower leg: No edema.   Skin:     General: Skin is warm and dry.      Capillary Refill: Capillary refill takes less than 2 seconds.   Neurological:      Mental Status: She is alert and oriented to person, place, and time.         ECG 12 Lead    Date/Time: 5/7/2025 1:20 PM  Performed by: Alida Hart APRN    Authorized by: Alida Hart APRN  Comparison: compared with previous ECG from 10/17/2024  Similar to previous ECG  Rhythm: sinus bradycardia  Rate: bradycardic  BPM: 54  Conduction: incomplete right bundle branch block    Clinical impression: non-specific ECG  Comments: QTc 441 ms           Assessment & Plan     Diagnoses and all orders for this visit:    1. PAF (paroxysmal atrial fibrillation) (Primary)  -     apixaban (Eliquis) 5 MG tablet tablet; One tablet twice a day  Dispense: 60 tablet; Refill: 11  -     flecainide (TAMBOCOR) 100 MG tablet; Take 1 tablet by mouth 2 (Two) Times a Day.  Dispense: 180 tablet; Refill: 3  -     ECG 12 Lead    2. Essential hypertension    3. Long term current use of antiarrhythmic drug    4. History of cardiac radiofrequency ablation  -     ECG 12 Lead    5. Hypercholesteremia    6. Sleep apnea, unspecified type    7. Chest discomfort    Other orders  -     dilTIAZem CD (CARDIZEM CD) 240 MG 24 hr " capsule; Take 1 capsule by mouth Daily.  Dispense: 90 capsule; Refill: 3    PAF/antiarrhythmic/s/p ablation  - [id number] score 3  - EKG sinus bradycardia incomplete RBBB rate 54 bpm normal QTc and HI intervals.  - Continue Eliquis, flecainide and diltiazem    HTN  - BP controlled  - Continue diltiazem    Hypercholesteremia  - Labs with PCP and reported as normal  - Continue lifestyle management    Sleep apnea  - Following with pulmonology, Sarah Beth ORELLANA  - Compliant with CPAP    Chest discomfort  - Isolated incident occurring today  - If continues or happens again instructed her to call the office and we will order stress test     Stable from a cardiac standpoint. No further testing recommended at this time. No medication changes made today. Refills sent to pharmacy.    Visit Diagnoses:    ICD-10-CM ICD-9-CM   1. PAF (paroxysmal atrial fibrillation)  I48.0 427.31   2. Essential hypertension  I10 401.9   3. Long term current use of antiarrhythmic drug  Z79.899 V58.69   4. History of cardiac radiofrequency ablation  Z98.890 V15.1   5. Hypercholesteremia  E78.00 272.0   6. Sleep apnea, unspecified type  G47.30 780.57   7. Chest discomfort  R07.89 786.59       Meds Ordered During Visit:  New Medications Ordered This Visit   Medications    apixaban (Eliquis) 5 MG tablet tablet     Sig: One tablet twice a day     Dispense:  60 tablet     Refill:  11    dilTIAZem CD (CARDIZEM CD) 240 MG 24 hr capsule     Sig: Take 1 capsule by mouth Daily.     Dispense:  90 capsule     Refill:  3    flecainide (TAMBOCOR) 100 MG tablet     Sig: Take 1 tablet by mouth 2 (Two) Times a Day.     Dispense:  180 tablet     Refill:  3       Follow Up Appointment:   Return in about 6 months (around 11/7/2025), or if symptoms worsen or fail to improve.           This document has been electronically signed by ESTEBAN Palma  May 7, 2025 13:25 EDT    Dictated Utilizing Dragon Dictation: Part of this note may be an electronic  transcription/translation of spoken language to printed text using the Dragon Dictation System.

## (undated) DEVICE — DECANTER: Brand: UNBRANDED

## (undated) DEVICE — LIMB HOLDER, WRIST/ANKLE: Brand: DEROYAL

## (undated) DEVICE — TEMPERATURE ABLATION CATHETER: Brand: BLAZER® II XP

## (undated) DEVICE — DECANT BG O JET

## (undated) DEVICE — CANN NASL CO2 DIVIDED A/

## (undated) DEVICE — AVANTI + 5F STD W/GW: Brand: AVANTI

## (undated) DEVICE — SI AVANTI+ 7F STD W/GW  NO OBT: Brand: AVANTI

## (undated) DEVICE — LEX ELECTRO PHYSIOLOGY: Brand: MEDLINE INDUSTRIES, INC.

## (undated) DEVICE — CATH EP SUPRM QUADPOLAR JSN 5F 5MM 120CM

## (undated) DEVICE — CATH ADVISOR FL CIR MAP BIDIR DF 15X105MM

## (undated) DEVICE — CATH EP INQUIRY H CRV 7F 1-7-1MM 110CM

## (undated) DEVICE — SET PRIMARY GRVTY 10DP MALE LL 104IN

## (undated) DEVICE — SI AVANTI+ 8F STD W/GW  NO OBT: Brand: AVANTI

## (undated) DEVICE — ST INF PRI SMRTSTE 20DRP 2VLV 24ML 117

## (undated) DEVICE — ST EXT IV SMARTSITE 2VLV SP M LL 5ML IV1

## (undated) DEVICE — SOL NACL 0.9PCT 1000ML